# Patient Record
Sex: FEMALE | Race: OTHER | URBAN - METROPOLITAN AREA
[De-identification: names, ages, dates, MRNs, and addresses within clinical notes are randomized per-mention and may not be internally consistent; named-entity substitution may affect disease eponyms.]

---

## 2024-10-21 LAB
ABO, EXTERNAL RESULT: NORMAL
C. TRACHOMATIS, EXTERNAL RESULT: NOT DETECTED
HEP B, EXTERNAL RESULT: NEGATIVE
HEPATITIS C ANTIBODY, EXTERNAL RESULT: NEGATIVE
HIV, EXTERNAL RESULT: NORMAL
N. GONORRHOEAE, EXTERNAL RESULT: NOT DETECTED
RPR, EXTERNAL RESULT: NON REACTIVE
RUBELLA TITER, EXTERNAL RESULT: NORMAL

## 2024-10-29 ENCOUNTER — HOSPITAL ENCOUNTER (EMERGENCY)
Age: 29
Discharge: HOME | End: 2024-10-30
Payer: MEDICAID

## 2024-10-29 VITALS
OXYGEN SATURATION: 100 % | BODY MASS INDEX: 24.84 KG/M2 | HEART RATE: 92 BPM | TEMPERATURE: 98.3 F | DIASTOLIC BLOOD PRESSURE: 64 MMHG | WEIGHT: 135 LBS | RESPIRATION RATE: 18 BRPM | SYSTOLIC BLOOD PRESSURE: 118 MMHG | HEIGHT: 62 IN

## 2024-10-29 DIAGNOSIS — O21.9 NAUSEA AND VOMITING IN PREGNANCY: Primary | ICD-10-CM

## 2024-10-29 LAB
ALBUMIN SERPL-MCNC: 4.1 G/DL (ref 3.4–5)
ALBUMIN/GLOB SERPL: 1.3 {RATIO} (ref 1.1–2.2)
ALP SERPL-CCNC: 63 U/L (ref 40–129)
ALT SERPL-CCNC: 11 U/L (ref 10–40)
ANION GAP SERPL CALCULATED.3IONS-SCNC: 12 MMOL/L (ref 9–17)
AST SERPL-CCNC: 17 U/L (ref 15–37)
BASOPHILS # BLD: 0.03 K/UL
BASOPHILS NFR BLD: 0 % (ref 0–1)
BILIRUB SERPL-MCNC: 0.3 MG/DL (ref 0–1)
BUN SERPL-MCNC: 7 MG/DL (ref 7–20)
CALCIUM SERPL-MCNC: 9.4 MG/DL (ref 8.3–10.6)
CHLORIDE SERPL-SCNC: 104 MMOL/L (ref 99–110)
CO2 SERPL-SCNC: 21 MMOL/L (ref 21–32)
CREAT SERPL-MCNC: 0.4 MG/DL (ref 0.6–1.1)
EOSINOPHIL # BLD: 0.09 K/UL
EOSINOPHILS RELATIVE PERCENT: 1 % (ref 0–3)
ERYTHROCYTE [DISTWIDTH] IN BLOOD BY AUTOMATED COUNT: 13.2 % (ref 11.7–14.9)
GFR, ESTIMATED: >90 ML/MIN/1.73M2
GLUCOSE SERPL-MCNC: 102 MG/DL (ref 74–99)
HCT VFR BLD AUTO: 36 % (ref 37–47)
HGB BLD-MCNC: 11.5 G/DL (ref 12.5–16)
IMM GRANULOCYTES # BLD AUTO: 0.02 K/UL
IMM GRANULOCYTES NFR BLD: 0 %
LYMPHOCYTES NFR BLD: 3.13 K/UL
LYMPHOCYTES RELATIVE PERCENT: 38 % (ref 24–44)
MCH RBC QN AUTO: 26.2 PG (ref 27–31)
MCHC RBC AUTO-ENTMCNC: 31.9 G/DL (ref 32–36)
MCV RBC AUTO: 82 FL (ref 78–100)
MONOCYTES NFR BLD: 0.71 K/UL
MONOCYTES NFR BLD: 9 % (ref 0–4)
NEUTROPHILS NFR BLD: 51 % (ref 36–66)
NEUTS SEG NFR BLD: 4.17 K/UL
PLATELET # BLD AUTO: 353 K/UL (ref 140–440)
PMV BLD AUTO: 9.9 FL (ref 7.5–11.1)
POTASSIUM SERPL-SCNC: 3.5 MMOL/L (ref 3.5–5.1)
PROT SERPL-MCNC: 7.3 G/DL (ref 6.4–8.2)
RBC # BLD AUTO: 4.39 M/UL (ref 4.2–5.4)
SODIUM SERPL-SCNC: 138 MMOL/L (ref 136–145)
WBC OTHER # BLD: 8.2 K/UL (ref 4–10.5)

## 2024-10-29 PROCEDURE — 6360000002 HC RX W HCPCS: Performed by: PHYSICIAN ASSISTANT

## 2024-10-29 PROCEDURE — 81001 URINALYSIS AUTO W/SCOPE: CPT

## 2024-10-29 PROCEDURE — 85025 COMPLETE CBC W/AUTO DIFF WBC: CPT

## 2024-10-29 PROCEDURE — 99284 EMERGENCY DEPT VISIT MOD MDM: CPT

## 2024-10-29 PROCEDURE — 80053 COMPREHEN METABOLIC PANEL: CPT

## 2024-10-29 PROCEDURE — 96374 THER/PROPH/DIAG INJ IV PUSH: CPT

## 2024-10-29 PROCEDURE — 6370000000 HC RX 637 (ALT 250 FOR IP): Performed by: EMERGENCY MEDICINE

## 2024-10-29 RX ORDER — ONDANSETRON 2 MG/ML
4 INJECTION INTRAMUSCULAR; INTRAVENOUS ONCE
Status: COMPLETED | OUTPATIENT
Start: 2024-10-29 | End: 2024-10-29

## 2024-10-29 RX ORDER — ONDANSETRON 4 MG/1
8 TABLET, ORALLY DISINTEGRATING ORAL ONCE
Status: COMPLETED | OUTPATIENT
Start: 2024-10-29 | End: 2024-10-29

## 2024-10-29 RX ADMIN — ONDANSETRON 4 MG: 2 INJECTION INTRAMUSCULAR; INTRAVENOUS at 22:58

## 2024-10-29 RX ADMIN — ONDANSETRON 8 MG: 4 TABLET, ORALLY DISINTEGRATING ORAL at 22:38

## 2024-10-29 ASSESSMENT — LIFESTYLE VARIABLES
HOW OFTEN DO YOU HAVE A DRINK CONTAINING ALCOHOL: NEVER
HOW MANY STANDARD DRINKS CONTAINING ALCOHOL DO YOU HAVE ON A TYPICAL DAY: PATIENT DOES NOT DRINK

## 2024-10-29 ASSESSMENT — PAIN SCALES - GENERAL: PAINLEVEL_OUTOF10: 0

## 2024-10-30 LAB
BILIRUB UR QL STRIP: NEGATIVE
CHARACTER UR: ABNORMAL
CLARITY UR: CLEAR
COLOR UR: YELLOW
EPI CELLS #/AREA URNS HPF: 3 /HPF
GLUCOSE UR STRIP-MCNC: NEGATIVE MG/DL
HGB UR QL STRIP.AUTO: NEGATIVE
KETONES UR STRIP-MCNC: 40 MG/DL
LEUKOCYTE ESTERASE UR QL STRIP: ABNORMAL
MUCOUS THREADS URNS QL MICRO: ABNORMAL
NITRITE UR QL STRIP: NEGATIVE
PH UR STRIP: 8.5 [PH] (ref 5–8)
PROT UR STRIP-MCNC: ABNORMAL MG/DL
RBC #/AREA URNS HPF: 1 /HPF (ref 0–2)
SP GR UR STRIP: 1.02 (ref 1–1.03)
TRICHOMONAS #/AREA URNS HPF: ABNORMAL /[HPF]
UROBILINOGEN UR STRIP-ACNC: 0.2 EU/DL (ref 0–1)
WBC #/AREA URNS HPF: 2 /HPF (ref 0–5)

## 2024-10-30 RX ORDER — ONDANSETRON 4 MG/1
4 TABLET, ORALLY DISINTEGRATING ORAL 3 TIMES DAILY PRN
Qty: 21 TABLET | Refills: 0 | Status: SHIPPED | OUTPATIENT
Start: 2024-10-30

## 2024-10-30 NOTE — ED PROVIDER NOTES
EMERGENCY DEPARTMENT ENCOUNTER        Pt Name: Hannah Vaz  MRN: [id number]  Birthdate 1995  Date of evaluation: 10/29/2024  Provider: Rosa Ocasio PA-C  PCP: No primary care provider on file.    RANI. I have evaluated this patient.        Triage CHIEF COMPLAINT       Chief Complaint   Patient presents with    Emesis During Pregnancy     12 weeks pregnant and has been vomiting all day.          HISTORY OF PRESENT ILLNESS      Chief Complaint: Vomiting in pregnancy    Hannah Vaz is a 29 y.o. female who presents for vomiting in pregnancy.  History obtained using [facility]  #[id number].  She states she is about 12 weeks pregnant.  She has had vomiting throughout pregnancy but it has been all day today.  She denies abd pain, diarrhea, urinary symptoms, vaginal bleeding.          Nursing Notes were all reviewed and agreed with or any disagreements were addressed in the HPI.    REVIEW OF SYSTEMS     CONSTITUTIONAL:  Denies fever.  EYES:  Denies visual changes.  HEAD:  Denies headache.  ENT:  Denies earache, nasal congestion, sore throat.  NECK:  Denies neck pain.  RESPIRATORY:  Denies any shortness of breath.  CARDIOVASCULAR:  Denies chest pain.  GI:  + vomiting.  :  Denies urinary symptoms.  MUSCULOSKELETAL:  Denies extremity pain or swelling.  BACK:  Denies back pain.  INTEGUMENT:  Denies skin changes.  LYMPHATIC:  Denies lymphadenopathy.  NEUROLOGIC:  Denies any numbness/tingling.  PSYCHIATRIC:  Denies SI/HI.    PAST MEDICAL HISTORY   No past medical history on file.    SURGICAL HISTORY   No past surgical history on file.    CURRENTMEDICATIONS       Previous Medications    No medications on file       ALLERGIES     Patient has no known allergies.    FAMILYHISTORY     No family history on file.     SOCIAL HISTORY       Social History     Socioeconomic History    Marital status: Unknown       SCREENINGS           PHYSICAL EXAM       ED Triage Vitals   BP Systolic BP Percentile

## 2025-03-05 DIAGNOSIS — O24.419 GDM, CLASS A2: Primary | ICD-10-CM

## 2025-04-04 LAB — GBS, EXTERNAL RESULT: POSITIVE

## 2025-04-27 ENCOUNTER — ANESTHESIA EVENT (OUTPATIENT)
Dept: LABOR AND DELIVERY | Age: 30
End: 2025-04-27
Payer: MEDICAID

## 2025-04-27 ENCOUNTER — HOSPITAL ENCOUNTER (INPATIENT)
Age: 30
LOS: 5 days | Discharge: HOME | End: 2025-05-02
Attending: OBSTETRICS & GYNECOLOGY | Admitting: OBSTETRICS & GYNECOLOGY
Payer: MEDICAID

## 2025-04-27 ENCOUNTER — ANESTHESIA (OUTPATIENT)
Dept: LABOR AND DELIVERY | Age: 30
End: 2025-04-27
Payer: MEDICAID

## 2025-04-27 DIAGNOSIS — Z98.891 S/P PRIMARY LOW TRANSVERSE C-SECTION: Primary | ICD-10-CM

## 2025-04-27 LAB
ABO + RH BLD: NORMAL
ALBUMIN SERPL-MCNC: 3.8 G/DL (ref 3.4–5)
ALBUMIN/GLOB SERPL: 1.3 {RATIO} (ref 1.1–2.2)
ALP SERPL-CCNC: 177 U/L (ref 40–129)
ALT SERPL-CCNC: 21 U/L (ref 10–40)
ANION GAP SERPL CALCULATED.3IONS-SCNC: 18 MMOL/L (ref 9–17)
AST SERPL-CCNC: 24 U/L (ref 15–37)
BASOPHILS # BLD: 0.02 K/UL
BASOPHILS NFR BLD: 0 % (ref 0–1)
BILIRUB SERPL-MCNC: 0.3 MG/DL (ref 0–1)
BLOOD BANK SAMPLE EXPIRATION: NORMAL
BLOOD GROUP ANTIBODIES SERPL: NEGATIVE
BUN SERPL-MCNC: 11 MG/DL (ref 7–20)
CALCIUM SERPL-MCNC: 10.3 MG/DL (ref 8.3–10.6)
CHLORIDE SERPL-SCNC: 103 MMOL/L (ref 99–110)
CO2 SERPL-SCNC: 16 MMOL/L (ref 21–32)
CREAT SERPL-MCNC: 0.6 MG/DL (ref 0.6–1.1)
CREAT UR-MCNC: 190 MG/DL (ref 28–217)
EOSINOPHIL # BLD: 0.01 K/UL
EOSINOPHILS RELATIVE PERCENT: 0 % (ref 0–3)
ERYTHROCYTE [DISTWIDTH] IN BLOOD BY AUTOMATED COUNT: 15.5 % (ref 11.7–14.9)
GFR, ESTIMATED: >90 ML/MIN/1.73M2
GLUCOSE BLD-MCNC: 116 MG/DL (ref 74–99)
GLUCOSE BLD-MCNC: 77 MG/DL (ref 74–99)
GLUCOSE SERPL-MCNC: 104 MG/DL (ref 74–99)
HCT VFR BLD AUTO: 38.6 % (ref 37–47)
HGB BLD-MCNC: 12.2 G/DL (ref 12.5–16)
IMM GRANULOCYTES # BLD AUTO: 0.03 K/UL
IMM GRANULOCYTES NFR BLD: 0 %
LYMPHOCYTES NFR BLD: 2.03 K/UL
LYMPHOCYTES RELATIVE PERCENT: 22 % (ref 24–44)
MCH RBC QN AUTO: 25.6 PG (ref 27–31)
MCHC RBC AUTO-ENTMCNC: 31.6 G/DL (ref 32–36)
MCV RBC AUTO: 81.1 FL (ref 78–100)
MONOCYTES NFR BLD: 0.65 K/UL
MONOCYTES NFR BLD: 7 % (ref 0–5)
NEUTROPHILS NFR BLD: 71 % (ref 36–66)
NEUTS SEG NFR BLD: 6.62 K/UL
PLATELET # BLD AUTO: 320 K/UL (ref 140–440)
PMV BLD AUTO: 10.9 FL (ref 7.5–11.1)
POTASSIUM SERPL-SCNC: 4.2 MMOL/L (ref 3.5–5.1)
PROT SERPL-MCNC: 6.7 G/DL (ref 6.4–8.2)
RBC # BLD AUTO: 4.76 M/UL (ref 4.2–5.4)
SODIUM SERPL-SCNC: 138 MMOL/L (ref 136–145)
T PALLIDUM AB SER QL IA: NONREACTIVE
TOTAL PROTEIN, URINE: 48 MG/DL
URATE SERPL-MCNC: 6.7 MG/DL (ref 2.6–6)
URINE TOTAL PROTEIN CREATININE RATIO: 0.25 (ref 0–0.2)
WBC OTHER # BLD: 9.4 K/UL (ref 4–10.5)

## 2025-04-27 PROCEDURE — 82570 ASSAY OF URINE CREATININE: CPT

## 2025-04-27 PROCEDURE — 84550 ASSAY OF BLOOD/URIC ACID: CPT

## 2025-04-27 PROCEDURE — 82962 GLUCOSE BLOOD TEST: CPT

## 2025-04-27 PROCEDURE — 86780 TREPONEMA PALLIDUM: CPT

## 2025-04-27 PROCEDURE — 86850 RBC ANTIBODY SCREEN: CPT

## 2025-04-27 PROCEDURE — 84156 ASSAY OF PROTEIN URINE: CPT

## 2025-04-27 PROCEDURE — 6370000000 HC RX 637 (ALT 250 FOR IP): Performed by: OBSTETRICS & GYNECOLOGY

## 2025-04-27 PROCEDURE — 1220000000 HC SEMI PRIVATE OB R&B

## 2025-04-27 PROCEDURE — 6360000002 HC RX W HCPCS: Performed by: OBSTETRICS & GYNECOLOGY

## 2025-04-27 PROCEDURE — 86900 BLOOD TYPING SEROLOGIC ABO: CPT

## 2025-04-27 PROCEDURE — 80307 DRUG TEST PRSMV CHEM ANLYZR: CPT

## 2025-04-27 PROCEDURE — 80053 COMPREHEN METABOLIC PANEL: CPT

## 2025-04-27 PROCEDURE — 2580000003 HC RX 258: Performed by: OBSTETRICS & GYNECOLOGY

## 2025-04-27 PROCEDURE — 86901 BLOOD TYPING SEROLOGIC RH(D): CPT

## 2025-04-27 PROCEDURE — 85025 COMPLETE CBC W/AUTO DIFF WBC: CPT

## 2025-04-27 RX ORDER — CARBOPROST TROMETHAMINE 250 UG/ML
250 INJECTION, SOLUTION INTRAMUSCULAR PRN
Status: DISCONTINUED | OUTPATIENT
Start: 2025-04-27 | End: 2025-05-02 | Stop reason: HOSPADM

## 2025-04-27 RX ORDER — SODIUM CHLORIDE 0.9 % (FLUSH) 0.9 %
5-40 SYRINGE (ML) INJECTION EVERY 12 HOURS SCHEDULED
Status: DISCONTINUED | OUTPATIENT
Start: 2025-04-27 | End: 2025-05-02 | Stop reason: HOSPADM

## 2025-04-27 RX ORDER — SODIUM CHLORIDE, SODIUM LACTATE, POTASSIUM CHLORIDE, AND CALCIUM CHLORIDE .6; .31; .03; .02 G/100ML; G/100ML; G/100ML; G/100ML
1000 INJECTION, SOLUTION INTRAVENOUS PRN
Status: DISCONTINUED | OUTPATIENT
Start: 2025-04-27 | End: 2025-05-02 | Stop reason: HOSPADM

## 2025-04-27 RX ORDER — SODIUM CHLORIDE 9 MG/ML
INJECTION, SOLUTION INTRAVENOUS PRN
Status: DISCONTINUED | OUTPATIENT
Start: 2025-04-27 | End: 2025-05-02 | Stop reason: HOSPADM

## 2025-04-27 RX ORDER — GLUCAGON 1 MG/ML
1 KIT INJECTION PRN
Status: DISCONTINUED | OUTPATIENT
Start: 2025-04-27 | End: 2025-05-02 | Stop reason: HOSPADM

## 2025-04-27 RX ORDER — FAMOTIDINE 10 MG/ML
20 INJECTION, SOLUTION INTRAVENOUS 2 TIMES DAILY PRN
Status: DISCONTINUED | OUTPATIENT
Start: 2025-04-27 | End: 2025-04-29 | Stop reason: HOSPADM

## 2025-04-27 RX ORDER — SODIUM CHLORIDE, SODIUM LACTATE, POTASSIUM CHLORIDE, AND CALCIUM CHLORIDE .6; .31; .03; .02 G/100ML; G/100ML; G/100ML; G/100ML
500 INJECTION, SOLUTION INTRAVENOUS PRN
Status: DISCONTINUED | OUTPATIENT
Start: 2025-04-27 | End: 2025-05-02 | Stop reason: HOSPADM

## 2025-04-27 RX ORDER — TERBUTALINE SULFATE 1 MG/ML
0.25 INJECTION SUBCUTANEOUS
Status: DISCONTINUED | OUTPATIENT
Start: 2025-04-27 | End: 2025-05-02 | Stop reason: HOSPADM

## 2025-04-27 RX ORDER — DEXTROSE MONOHYDRATE AND SODIUM CHLORIDE 5; .45 G/100ML; G/100ML
INJECTION, SOLUTION INTRAVENOUS CONTINUOUS
Status: DISCONTINUED | OUTPATIENT
Start: 2025-04-27 | End: 2025-05-02 | Stop reason: HOSPADM

## 2025-04-27 RX ORDER — INSULIN LISPRO 100 [IU]/ML
0-4 INJECTION, SOLUTION INTRAVENOUS; SUBCUTANEOUS PRN
Status: DISCONTINUED | OUTPATIENT
Start: 2025-04-27 | End: 2025-04-30

## 2025-04-27 RX ORDER — DEXTROSE MONOHYDRATE 50 MG/ML
100 INJECTION, SOLUTION INTRAVENOUS PRN
Status: DISCONTINUED | OUTPATIENT
Start: 2025-04-27 | End: 2025-05-02 | Stop reason: HOSPADM

## 2025-04-27 RX ORDER — LIDOCAINE HYDROCHLORIDE 10 MG/ML
30 INJECTION, SOLUTION EPIDURAL; INFILTRATION; INTRACAUDAL; PERINEURAL PRN
Status: DISCONTINUED | OUTPATIENT
Start: 2025-04-27 | End: 2025-05-02 | Stop reason: HOSPADM

## 2025-04-27 RX ORDER — FENTANYL CITRATE 50 UG/ML
100 INJECTION, SOLUTION INTRAMUSCULAR; INTRAVENOUS
Status: DISCONTINUED | OUTPATIENT
Start: 2025-04-27 | End: 2025-04-29 | Stop reason: HOSPADM

## 2025-04-27 RX ORDER — ONDANSETRON 4 MG/1
4 TABLET, ORALLY DISINTEGRATING ORAL EVERY 8 HOURS PRN
Status: DISCONTINUED | OUTPATIENT
Start: 2025-04-27 | End: 2025-04-29 | Stop reason: SDUPTHER

## 2025-04-27 RX ORDER — SODIUM CHLORIDE 0.9 % (FLUSH) 0.9 %
5-40 SYRINGE (ML) INJECTION PRN
Status: DISCONTINUED | OUTPATIENT
Start: 2025-04-27 | End: 2025-05-02 | Stop reason: HOSPADM

## 2025-04-27 RX ORDER — SODIUM CHLORIDE, SODIUM LACTATE, POTASSIUM CHLORIDE, CALCIUM CHLORIDE 600; 310; 30; 20 MG/100ML; MG/100ML; MG/100ML; MG/100ML
INJECTION, SOLUTION INTRAVENOUS CONTINUOUS
Status: DISCONTINUED | OUTPATIENT
Start: 2025-04-27 | End: 2025-05-02 | Stop reason: HOSPADM

## 2025-04-27 RX ORDER — TRANEXAMIC ACID 10 MG/ML
1000 INJECTION, SOLUTION INTRAVENOUS
Status: DISCONTINUED | OUTPATIENT
Start: 2025-04-27 | End: 2025-04-29 | Stop reason: SDUPTHER

## 2025-04-27 RX ORDER — MISOPROSTOL 200 UG/1
400 TABLET ORAL PRN
Status: DISCONTINUED | OUTPATIENT
Start: 2025-04-27 | End: 2025-04-29 | Stop reason: SDUPTHER

## 2025-04-27 RX ORDER — ONDANSETRON 2 MG/ML
4 INJECTION INTRAMUSCULAR; INTRAVENOUS EVERY 6 HOURS PRN
Status: DISCONTINUED | OUTPATIENT
Start: 2025-04-27 | End: 2025-04-29 | Stop reason: SDUPTHER

## 2025-04-27 RX ADMIN — AMPICILLIN SODIUM 2000 MG: 2 INJECTION, POWDER, FOR SOLUTION INTRAVENOUS at 20:28

## 2025-04-27 RX ADMIN — Medication 50 MCG: at 20:31

## 2025-04-27 RX ADMIN — SODIUM CHLORIDE, SODIUM LACTATE, POTASSIUM CHLORIDE, AND CALCIUM CHLORIDE: .6; .31; .03; .02 INJECTION, SOLUTION INTRAVENOUS at 20:27

## 2025-04-27 NOTE — FLOWSHEET NOTE
Patient presents to unit for a scheduled NST. Pt shown to LT02 and oriented to room. Pt denies vaginal bleeding and LOF and states she is feeling FM. EFM and toco applied. Abd palpates soft and non-tender. JUDIE OTEROM made aware of pt's arrival. Call light within reach.

## 2025-04-28 LAB
AMPHET UR QL SCN: NEGATIVE
BARBITURATES UR QL SCN: NEGATIVE
BENZODIAZ UR QL: NEGATIVE
CANNABINOIDS UR QL SCN: NEGATIVE
COCAINE UR QL SCN: NEGATIVE
FENTANYL UR QL: NEGATIVE
GLUCOSE BLD-MCNC: 102 MG/DL (ref 74–99)
GLUCOSE BLD-MCNC: 104 MG/DL (ref 74–99)
GLUCOSE BLD-MCNC: 128 MG/DL (ref 74–99)
GLUCOSE BLD-MCNC: 137 MG/DL (ref 74–99)
GLUCOSE BLD-MCNC: 145 MG/DL (ref 74–99)
GLUCOSE BLD-MCNC: 162 MG/DL (ref 74–99)
GLUCOSE BLD-MCNC: 185 MG/DL (ref 74–99)
GLUCOSE BLD-MCNC: 198 MG/DL (ref 74–99)
GLUCOSE BLD-MCNC: 61 MG/DL (ref 74–99)
GLUCOSE BLD-MCNC: 86 MG/DL (ref 74–99)
GLUCOSE BLD-MCNC: 88 MG/DL (ref 74–99)
GLUCOSE BLD-MCNC: 91 MG/DL (ref 74–99)
GLUCOSE BLD-MCNC: 92 MG/DL (ref 74–99)
OPIATES UR QL SCN: NEGATIVE
OXYCODONE UR QL SCN: NEGATIVE
TEST INFORMATION: NORMAL

## 2025-04-28 PROCEDURE — 6360000002 HC RX W HCPCS: Performed by: OBSTETRICS & GYNECOLOGY

## 2025-04-28 PROCEDURE — 1220000000 HC SEMI PRIVATE OB R&B

## 2025-04-28 PROCEDURE — 82962 GLUCOSE BLOOD TEST: CPT

## 2025-04-28 PROCEDURE — 2580000003 HC RX 258: Performed by: OBSTETRICS & GYNECOLOGY

## 2025-04-28 PROCEDURE — APPNB30 APP NON BILLABLE TIME 0-30 MINS: Performed by: ADVANCED PRACTICE MIDWIFE

## 2025-04-28 PROCEDURE — 6360000002 HC RX W HCPCS: Performed by: ADVANCED PRACTICE MIDWIFE

## 2025-04-28 PROCEDURE — 6370000000 HC RX 637 (ALT 250 FOR IP): Performed by: OBSTETRICS & GYNECOLOGY

## 2025-04-28 RX ORDER — SODIUM CHLORIDE 9 MG/ML
INJECTION, SOLUTION INTRAVENOUS
Status: DISPENSED
Start: 2025-04-28 | End: 2025-04-28

## 2025-04-28 RX ADMIN — DEXTROSE AND SODIUM CHLORIDE: 5; .45 INJECTION, SOLUTION INTRAVENOUS at 08:09

## 2025-04-28 RX ADMIN — SODIUM CHLORIDE, SODIUM LACTATE, POTASSIUM CHLORIDE, AND CALCIUM CHLORIDE: .6; .31; .03; .02 INJECTION, SOLUTION INTRAVENOUS at 17:51

## 2025-04-28 RX ADMIN — AMPICILLIN SODIUM 1000 MG: 1 INJECTION, POWDER, FOR SOLUTION INTRAMUSCULAR; INTRAVENOUS at 04:37

## 2025-04-28 RX ADMIN — AMPICILLIN SODIUM 1000 MG: 1 INJECTION, POWDER, FOR SOLUTION INTRAMUSCULAR; INTRAVENOUS at 08:00

## 2025-04-28 RX ADMIN — AMPICILLIN SODIUM 1000 MG: 1 INJECTION, POWDER, FOR SOLUTION INTRAMUSCULAR; INTRAVENOUS at 00:36

## 2025-04-28 RX ADMIN — INSULIN LISPRO 3 UNITS: 100 INJECTION, SOLUTION INTRAVENOUS; SUBCUTANEOUS at 10:42

## 2025-04-28 RX ADMIN — AMPICILLIN SODIUM 1000 MG: 1 INJECTION, POWDER, FOR SOLUTION INTRAMUSCULAR; INTRAVENOUS at 20:21

## 2025-04-28 RX ADMIN — DEXTROSE AND SODIUM CHLORIDE: 5; .45 INJECTION, SOLUTION INTRAVENOUS at 09:05

## 2025-04-28 RX ADMIN — ONDANSETRON 4 MG: 2 INJECTION INTRAMUSCULAR; INTRAVENOUS at 17:24

## 2025-04-28 RX ADMIN — DEXTROSE AND SODIUM CHLORIDE: 5; .45 INJECTION, SOLUTION INTRAVENOUS at 03:23

## 2025-04-28 RX ADMIN — Medication 1 MILLI-UNITS/MIN: at 10:35

## 2025-04-28 RX ADMIN — INSULIN LISPRO 4 UNITS: 100 INJECTION, SOLUTION INTRAVENOUS; SUBCUTANEOUS at 03:22

## 2025-04-28 RX ADMIN — INSULIN LISPRO 4 UNITS: 100 INJECTION, SOLUTION INTRAVENOUS; SUBCUTANEOUS at 04:44

## 2025-04-28 RX ADMIN — INSULIN LISPRO 3 UNITS: 100 INJECTION, SOLUTION INTRAVENOUS; SUBCUTANEOUS at 12:17

## 2025-04-28 RX ADMIN — SODIUM CHLORIDE, SODIUM LACTATE, POTASSIUM CHLORIDE, AND CALCIUM CHLORIDE: .6; .31; .03; .02 INJECTION, SOLUTION INTRAVENOUS at 07:37

## 2025-04-28 RX ADMIN — AMPICILLIN SODIUM 1000 MG: 1 INJECTION, POWDER, FOR SOLUTION INTRAMUSCULAR; INTRAVENOUS at 16:02

## 2025-04-28 RX ADMIN — AMPICILLIN SODIUM 1000 MG: 1 INJECTION, POWDER, FOR SOLUTION INTRAMUSCULAR; INTRAVENOUS at 11:57

## 2025-04-28 RX ADMIN — INSULIN LISPRO 1 UNITS: 100 INJECTION, SOLUTION INTRAVENOUS; SUBCUTANEOUS at 22:51

## 2025-04-28 RX ADMIN — DEXTROSE AND SODIUM CHLORIDE: 5; .45 INJECTION, SOLUTION INTRAVENOUS at 17:25

## 2025-04-28 RX ADMIN — Medication 50 MCG: at 00:36

## 2025-04-28 NOTE — PROGRESS NOTES
Department of Obstetrics and Gynecology  Labor and Delivery   Midwifery Progress Note      SUBJECTIVE:  Hannah is comfortable with ctxs.     OBJECTIVE:      Fetal heart rate:       Baseline Heart Rate:  145        Accelerations:  present       Variability:  moderate       Decelerations:  absent         Contraction frequency: 2 minutes    Membranes:  Ruptured clear fluid    Cervix:         Dilation:  4 cm         Effacement:  60         Station:  -2         Position:  mid             ASSESSMENT     28 yo  at 38 weeks   Cat I FHR tracing  GBS +   Type II DM; uncontrolled     PLAN:    AROM'd for moderate amount of clear fluid.     Continue to titrate pitocin per protocol.     Anticipate labor progress and .     I have collaborated and updated Dr Hogan and the MD agrees with the current POC.     Time Spent: 10 min    SUE Amado CNM

## 2025-04-28 NOTE — H&P
Department of Obstetrics and Gynecology   Obstetrics History and Physical        CHIEF COMPLAINT: Patient presents for scheduled fetal nonstress test due to diabetes    HISTORY OF PRESENT ILLNESS:      The patient is a 29 y.o. female at 37w6d.  OB History          1    Para        Term                AB        Living             SAB        IAB        Ectopic        Molar        Multiple        Live Births                Patient presents with a chief complaint as above and is being admitted for induction due to uncontrolled diabetes, gestational hypertension    Estimated Due Date: Estimated Date of Delivery: 25    PRENATAL CARE:    Complicated by: Pre-existing diabetes managed by MFM    PAST OB HISTORY  OB History          1    Para        Term                AB        Living             SAB        IAB        Ectopic        Molar        Multiple        Live Births                    Past Medical History:        Diagnosis Date    Diabetes type 2 (HCC)      Past Surgical History:    History reviewed. No pertinent surgical history.  Allergies:  Patient has no known allergies.  Social History:    Social History     Socioeconomic History    Marital status: Unknown     Spouse name: Not on file    Number of children: Not on file    Years of education: Not on file    Highest education level: Not on file   Occupational History    Not on file   Tobacco Use    Smoking status: Not on file    Smokeless tobacco: Not on file   Substance and Sexual Activity    Alcohol use: Not on file    Drug use: Not on file    Sexual activity: Not on file   Other Topics Concern    Not on file   Social History Narrative    Not on file     Social Drivers of Health     Financial Resource Strain: Not on file   Food Insecurity: Food Insecurity Present (2025)    Hunger Vital Sign     Worried About Running Out of Food in the Last Year: Sometimes true     Ran Out of Food in the Last Year: Sometimes true

## 2025-04-28 NOTE — PLAN OF CARE
Problem: Chronic Conditions and Co-morbidities  Goal: Patient's chronic conditions and co-morbidity symptoms are monitored and maintained or improved  Outcome: Progressing  Flowsheets (Taken 2025)  Care Plan - Patient's Chronic Conditions and Co-Morbidity Symptoms are Monitored and Maintained or Improved:   Monitor and assess patient's chronic conditions and comorbid symptoms for stability, deterioration, or improvement   Collaborate with multidisciplinary team to address chronic and comorbid conditions and prevent exacerbation or deterioration   Update acute care plan with appropriate goals if chronic or comorbid symptoms are exacerbated and prevent overall improvement and discharge     Problem: Vaginal Birth or  Section  Goal: Fetal and maternal status remain reassuring during the birth process  Description:  Birth OB-Pregnancy care plan goal which identifies if the fetal and maternal status remain reassuring during the birth process  Outcome: Progressing  Flowsheets (Taken 2025)  Fetal and Maternal Status Remain Reassuring During the Birth Process:   Monitor vital signs   Monitor uterine activity   Deep vein thrombosis prophylaxis ( delivery)   Monitor fetal heart rate   Monitor labor progression (Vaginal delivery)     Problem: Pain  Goal: Verbalizes/displays adequate comfort level or baseline comfort level  Outcome: Progressing  Flowsheets (Taken 2025)  Verbalizes/displays adequate comfort level or baseline comfort level:   Encourage patient to monitor pain and request assistance   Assess pain using appropriate pain scale   Administer analgesics based on type and severity of pain and evaluate response   Implement non-pharmacological measures as appropriate and evaluate response   Consider cultural and social influences on pain and pain management   Notify Licensed Independent Practitioner if interventions unsuccessful or patient reports new pain     Problem:

## 2025-04-28 NOTE — PLAN OF CARE
Problem: Chronic Conditions and Co-morbidities  Goal: Patient's chronic conditions and co-morbidity symptoms are monitored and maintained or improved  4/28/2025 0804 by Kaykay Acosta, RN  Outcome: Progressing  4/27/2025 2129 by Klarissa Murrell, RN  Outcome: Progressing  Flowsheets (Taken 4/27/2025 2129)  Care Plan - Patient's Chronic Conditions and Co-Morbidity Symptoms are Monitored and Maintained or Improved:   Monitor and assess patient's chronic conditions and comorbid symptoms for stability, deterioration, or improvement   Collaborate with multidisciplinary team to address chronic and comorbid conditions and prevent exacerbation or deterioration   Update acute care plan with appropriate goals if chronic or comorbid symptoms are exacerbated and prevent overall improvement and discharge

## 2025-04-28 NOTE — FLOWSHEET NOTE
Patient ambulated to labor room 6 for induction.  Oriented to room.  Instructed patient to change into gown.  Verbalizes understanding.  Call light within reach.

## 2025-04-28 NOTE — CONSULTS
Session ID: [id number]  Language: Spencer Heath   ID: #[id number]   Name: Chino    Rajeev for assessment, patient educated on epidural, wishes to continue to labor without at this time. Patient educated on increase in pain with titration of pitocin, verbalizes understanding. Advised that patient should move as much as she can to help progress labor, patient verbalizes understanding. Patient and support people deny any questions or needs at this time.

## 2025-04-28 NOTE — PROGRESS NOTES
Patient/caregivers speak Bangladeshi Creole as their preferred language for their healthcare communication. For safe communication, use the [employer]  carts or call:      Spencer Angulo Senior -Navigator [phone]    General phone: [phone] ( [phone]) Email: [email or web address]      Please always document the use of interpreting services ('s ID number) in your clinical notes.      Our interpreters are available for team members working with limited English proficient (LEP) patients remotely, via phone or video or in person (if needed for special cases).      When using family members to interpret, for the safety of the patient and protection of the communication of both our patient and [facility] staff the VRI or telephonic  should remain on the line to monitor that all communication is accurate and complete. The  should be instructed to notify [facility] staff immediately if there are any inaccuracies.    Thank you,      Jaiden Haider  Senior /Navigator  Language Services Department  [phone]

## 2025-04-28 NOTE — FLOWSHEET NOTE
Patient laying supine.  Instructed patient to turn to side and remain there.  Verbalizes understanding.  Patient repositioned to left side.

## 2025-04-28 NOTE — ANESTHESIA PRE PROCEDURE
Department of Anesthesiology  Preprocedure Note       Name:  Hannah Vaz   Age:  29 y.o.  :  1995                                          MRN:  [id number]         Date:  2025      Surgeon: * No surgeons listed *    Procedure: * No procedures listed *    Medications prior to admission:   Prior to Admission medications    Medication Sig Start Date End Date Taking? Authorizing Provider   metFORMIN (GLUCOPHAGE) 1000 MG tablet Take 1 tablet by mouth 2 times daily (with meals)   Yes Provider, MD Ananya   ondansetron (ZOFRAN-ODT) 4 MG disintegrating tablet Take 1 tablet by mouth 3 times daily as needed for Nausea or Vomiting  Patient not taking: Reported on 2025 10/30/24   Rosa Ocasio PA-C       Current medications:    Current Facility-Administered Medications   Medication Dose Route Frequency Provider Last Rate Last Admin   • lactated ringers infusion   IntraVENous Continuous Nick Diamond  mL/hr at 25 New Bag at 25   • lactated ringers bolus 500 mL  500 mL IntraVENous PRN Nick Diamond MD        Or   • lactated ringers bolus 1,000 mL  1,000 mL IntraVENous PRN Nick Diamond MD       • sodium chloride flush 0.9 % injection 5-40 mL  5-40 mL IntraVENous 2 times per day Nick Diamond MD       • sodium chloride flush 0.9 % injection 5-40 mL  5-40 mL IntraVENous PRN Nick Diamond MD       • 0.9 % sodium chloride infusion   IntraVENous PRN Nick Diamond MD       • carboprost (HEMABATE) injection 250 mcg  250 mcg IntraMUSCular PRN Nick Diamond MD       • miSOPROStol (CYTOTEC) tablet 400 mcg  400 mcg Buccal PRN Nick Diamond MD       • tranexamic acid-NaCl IVPB premix 1,000 mg  1,000 mg IntraVENous Once PRN Nick Diamond MD       • oxytocin (PITOCIN) 30 units in 500 mL infusion  87.3 sheila-units/min IntraVENous Continuous PRN Nick Diamond MD        And   •

## 2025-04-29 LAB
GLUCOSE BLD-MCNC: 137 MG/DL (ref 74–99)
GLUCOSE BLD-MCNC: 139 MG/DL (ref 74–99)
GLUCOSE BLD-MCNC: 152 MG/DL (ref 74–99)
GLUCOSE BLD-MCNC: 187 MG/DL (ref 74–99)
GLUCOSE BLD-MCNC: 207 MG/DL (ref 74–99)
GLUCOSE BLD-MCNC: 207 MG/DL (ref 74–99)

## 2025-04-29 PROCEDURE — 3700000001 HC ADD 15 MINUTES (ANESTHESIA): Performed by: OBSTETRICS & GYNECOLOGY

## 2025-04-29 PROCEDURE — 6360000002 HC RX W HCPCS: Performed by: OBSTETRICS & GYNECOLOGY

## 2025-04-29 PROCEDURE — 6370000000 HC RX 637 (ALT 250 FOR IP): Performed by: OBSTETRICS & GYNECOLOGY

## 2025-04-29 PROCEDURE — 7100000000 HC PACU RECOVERY - FIRST 15 MIN: Performed by: OBSTETRICS & GYNECOLOGY

## 2025-04-29 PROCEDURE — 3609079900 HC CESAREAN SECTION: Performed by: OBSTETRICS & GYNECOLOGY

## 2025-04-29 PROCEDURE — 2580000003 HC RX 258: Performed by: OBSTETRICS & GYNECOLOGY

## 2025-04-29 PROCEDURE — 82962 GLUCOSE BLOOD TEST: CPT

## 2025-04-29 PROCEDURE — 2500000003 HC RX 250 WO HCPCS: Performed by: OBSTETRICS & GYNECOLOGY

## 2025-04-29 PROCEDURE — 2709999900 HC NON-CHARGEABLE SUPPLY: Performed by: OBSTETRICS & GYNECOLOGY

## 2025-04-29 PROCEDURE — 3700000025 EPIDURAL BLOCK: Performed by: ANESTHESIOLOGY

## 2025-04-29 PROCEDURE — 7100000001 HC PACU RECOVERY - ADDTL 15 MIN: Performed by: OBSTETRICS & GYNECOLOGY

## 2025-04-29 PROCEDURE — 1220000000 HC SEMI PRIVATE OB R&B

## 2025-04-29 PROCEDURE — 6360000002 HC RX W HCPCS: Performed by: NURSE ANESTHETIST, CERTIFIED REGISTERED

## 2025-04-29 PROCEDURE — 3700000000 HC ANESTHESIA ATTENDED CARE: Performed by: OBSTETRICS & GYNECOLOGY

## 2025-04-29 PROCEDURE — 94761 N-INVAS EAR/PLS OXIMETRY MLT: CPT

## 2025-04-29 PROCEDURE — 2720000010 HC SURG SUPPLY STERILE: Performed by: OBSTETRICS & GYNECOLOGY

## 2025-04-29 RX ORDER — ROPIVACAINE HYDROCHLORIDE 2 MG/ML
10 INJECTION, SOLUTION EPIDURAL; INFILTRATION; PERINEURAL CONTINUOUS
Status: DISCONTINUED | OUTPATIENT
Start: 2025-04-29 | End: 2025-04-29 | Stop reason: HOSPADM

## 2025-04-29 RX ORDER — SODIUM CHLORIDE 0.9 % (FLUSH) 0.9 %
10 SYRINGE (ML) INJECTION EVERY 12 HOURS SCHEDULED
Status: DISCONTINUED | OUTPATIENT
Start: 2025-04-29 | End: 2025-05-02 | Stop reason: HOSPADM

## 2025-04-29 RX ORDER — ACETAMINOPHEN 500 MG
1000 TABLET ORAL EVERY 8 HOURS
Status: DISCONTINUED | OUTPATIENT
Start: 2025-04-29 | End: 2025-05-02 | Stop reason: HOSPADM

## 2025-04-29 RX ORDER — FENTANYL CITRATE 50 UG/ML
INJECTION, SOLUTION INTRAMUSCULAR; INTRAVENOUS
Status: DISCONTINUED | OUTPATIENT
Start: 2025-04-29 | End: 2025-04-29 | Stop reason: SDUPTHER

## 2025-04-29 RX ORDER — ONDANSETRON 4 MG/1
4 TABLET, ORALLY DISINTEGRATING ORAL EVERY 6 HOURS PRN
Status: DISCONTINUED | OUTPATIENT
Start: 2025-04-29 | End: 2025-04-29 | Stop reason: SDUPTHER

## 2025-04-29 RX ORDER — DEXAMETHASONE SODIUM PHOSPHATE 4 MG/ML
INJECTION, SOLUTION INTRA-ARTICULAR; INTRALESIONAL; INTRAMUSCULAR; INTRAVENOUS; SOFT TISSUE
Status: DISCONTINUED | OUTPATIENT
Start: 2025-04-29 | End: 2025-04-29 | Stop reason: SDUPTHER

## 2025-04-29 RX ORDER — SODIUM CHLORIDE 9 MG/ML
INJECTION, SOLUTION INTRAVENOUS PRN
Status: DISCONTINUED | OUTPATIENT
Start: 2025-04-29 | End: 2025-05-02 | Stop reason: HOSPADM

## 2025-04-29 RX ORDER — ONDANSETRON 4 MG/1
4 TABLET, ORALLY DISINTEGRATING ORAL EVERY 8 HOURS PRN
Status: DISCONTINUED | OUTPATIENT
Start: 2025-04-29 | End: 2025-05-02 | Stop reason: HOSPADM

## 2025-04-29 RX ORDER — ENOXAPARIN SODIUM 100 MG/ML
40 INJECTION SUBCUTANEOUS DAILY
Status: DISCONTINUED | OUTPATIENT
Start: 2025-04-29 | End: 2025-05-02 | Stop reason: HOSPADM

## 2025-04-29 RX ORDER — FERROUS SULFATE 325(65) MG
325 TABLET ORAL EVERY OTHER DAY
Status: DISCONTINUED | OUTPATIENT
Start: 2025-04-29 | End: 2025-05-02 | Stop reason: HOSPADM

## 2025-04-29 RX ORDER — NALBUPHINE HYDROCHLORIDE 10 MG/ML
5 INJECTION INTRAMUSCULAR; INTRAVENOUS; SUBCUTANEOUS EVERY 4 HOURS PRN
Status: ACTIVE | OUTPATIENT
Start: 2025-04-29 | End: 2025-04-30

## 2025-04-29 RX ORDER — MISOPROSTOL 200 UG/1
800 TABLET ORAL PRN
Status: DISCONTINUED | OUTPATIENT
Start: 2025-04-29 | End: 2025-05-02 | Stop reason: HOSPADM

## 2025-04-29 RX ORDER — ONDANSETRON 2 MG/ML
INJECTION INTRAMUSCULAR; INTRAVENOUS
Status: DISCONTINUED | OUTPATIENT
Start: 2025-04-29 | End: 2025-04-29 | Stop reason: SDUPTHER

## 2025-04-29 RX ORDER — NALOXONE HYDROCHLORIDE 0.4 MG/ML
INJECTION, SOLUTION INTRAMUSCULAR; INTRAVENOUS; SUBCUTANEOUS PRN
Status: DISCONTINUED | OUTPATIENT
Start: 2025-04-29 | End: 2025-04-29 | Stop reason: HOSPADM

## 2025-04-29 RX ORDER — SODIUM CHLORIDE 0.9 % (FLUSH) 0.9 %
5-40 SYRINGE (ML) INJECTION PRN
Status: DISCONTINUED | OUTPATIENT
Start: 2025-04-29 | End: 2025-05-02 | Stop reason: HOSPADM

## 2025-04-29 RX ORDER — ONDANSETRON 2 MG/ML
4 INJECTION INTRAMUSCULAR; INTRAVENOUS EVERY 6 HOURS PRN
Status: DISCONTINUED | OUTPATIENT
Start: 2025-04-29 | End: 2025-04-29 | Stop reason: SDUPTHER

## 2025-04-29 RX ORDER — MISOPROSTOL 200 UG/1
400 TABLET ORAL PRN
Status: DISCONTINUED | OUTPATIENT
Start: 2025-04-29 | End: 2025-05-02 | Stop reason: HOSPADM

## 2025-04-29 RX ORDER — OXYCODONE HYDROCHLORIDE 5 MG/1
10 TABLET ORAL EVERY 4 HOURS PRN
Status: DISCONTINUED | OUTPATIENT
Start: 2025-04-29 | End: 2025-05-02 | Stop reason: HOSPADM

## 2025-04-29 RX ORDER — NALOXONE HYDROCHLORIDE 0.4 MG/ML
INJECTION, SOLUTION INTRAMUSCULAR; INTRAVENOUS; SUBCUTANEOUS PRN
Status: ACTIVE | OUTPATIENT
Start: 2025-04-29 | End: 2025-04-30

## 2025-04-29 RX ORDER — LIDOCAINE HCL/EPINEPHRINE/PF 2%-1:200K
VIAL (ML) INJECTION
Status: DISCONTINUED | OUTPATIENT
Start: 2025-04-29 | End: 2025-04-29 | Stop reason: SDUPTHER

## 2025-04-29 RX ORDER — OXYCODONE HYDROCHLORIDE 5 MG/1
5 TABLET ORAL EVERY 4 HOURS PRN
Status: DISCONTINUED | OUTPATIENT
Start: 2025-04-29 | End: 2025-05-02 | Stop reason: HOSPADM

## 2025-04-29 RX ORDER — IBUPROFEN 800 MG/1
800 TABLET, FILM COATED ORAL EVERY 8 HOURS
Status: DISCONTINUED | OUTPATIENT
Start: 2025-04-30 | End: 2025-04-30

## 2025-04-29 RX ORDER — ESMOLOL HYDROCHLORIDE 10 MG/ML
INJECTION INTRAVENOUS
Status: DISCONTINUED | OUTPATIENT
Start: 2025-04-29 | End: 2025-04-29 | Stop reason: SDUPTHER

## 2025-04-29 RX ORDER — FAMOTIDINE 10 MG/ML
20 INJECTION, SOLUTION INTRAVENOUS ONCE
Status: COMPLETED | OUTPATIENT
Start: 2025-04-29 | End: 2025-04-29

## 2025-04-29 RX ORDER — SODIUM CHLORIDE, SODIUM LACTATE, POTASSIUM CHLORIDE, CALCIUM CHLORIDE 600; 310; 30; 20 MG/100ML; MG/100ML; MG/100ML; MG/100ML
INJECTION, SOLUTION INTRAVENOUS CONTINUOUS
Status: DISCONTINUED | OUTPATIENT
Start: 2025-04-29 | End: 2025-05-02 | Stop reason: HOSPADM

## 2025-04-29 RX ORDER — KETOROLAC TROMETHAMINE 30 MG/ML
30 INJECTION, SOLUTION INTRAMUSCULAR; INTRAVENOUS EVERY 6 HOURS
Status: DISCONTINUED | OUTPATIENT
Start: 2025-04-29 | End: 2025-04-30

## 2025-04-29 RX ORDER — MORPHINE SULFATE 0.5 MG/ML
INJECTION, SOLUTION EPIDURAL; INTRATHECAL; INTRAVENOUS
Status: DISCONTINUED | OUTPATIENT
Start: 2025-04-29 | End: 2025-04-29 | Stop reason: SDUPTHER

## 2025-04-29 RX ORDER — DOCUSATE SODIUM 100 MG/1
100 CAPSULE, LIQUID FILLED ORAL 2 TIMES DAILY
Status: DISCONTINUED | OUTPATIENT
Start: 2025-04-29 | End: 2025-05-02 | Stop reason: HOSPADM

## 2025-04-29 RX ORDER — TRANEXAMIC ACID 10 MG/ML
1000 INJECTION, SOLUTION INTRAVENOUS
Status: DISCONTINUED | OUTPATIENT
Start: 2025-04-29 | End: 2025-05-02 | Stop reason: HOSPADM

## 2025-04-29 RX ORDER — SODIUM CHLORIDE, SODIUM LACTATE, POTASSIUM CHLORIDE, AND CALCIUM CHLORIDE .6; .31; .03; .02 G/100ML; G/100ML; G/100ML; G/100ML
500 INJECTION, SOLUTION INTRAVENOUS ONCE
Status: COMPLETED | OUTPATIENT
Start: 2025-04-29 | End: 2025-04-29

## 2025-04-29 RX ORDER — SODIUM CHLORIDE 0.9 % (FLUSH) 0.9 %
5-40 SYRINGE (ML) INJECTION EVERY 12 HOURS SCHEDULED
Status: DISCONTINUED | OUTPATIENT
Start: 2025-04-29 | End: 2025-05-02 | Stop reason: HOSPADM

## 2025-04-29 RX ORDER — METFORMIN HYDROCHLORIDE 500 MG/1
1000 TABLET, EXTENDED RELEASE ORAL 2 TIMES DAILY WITH MEALS
Status: DISCONTINUED | OUTPATIENT
Start: 2025-04-29 | End: 2025-05-02 | Stop reason: HOSPADM

## 2025-04-29 RX ORDER — LIDOCAINE HYDROCHLORIDE AND EPINEPHRINE 15; 5 MG/ML; UG/ML
INJECTION, SOLUTION EPIDURAL
Status: DISCONTINUED | OUTPATIENT
Start: 2025-04-29 | End: 2025-04-29 | Stop reason: SDUPTHER

## 2025-04-29 RX ORDER — SWAB
1 SWAB, NON-MEDICATED MISCELLANEOUS DAILY
Status: DISCONTINUED | OUTPATIENT
Start: 2025-04-29 | End: 2025-05-02 | Stop reason: HOSPADM

## 2025-04-29 RX ORDER — SODIUM CHLORIDE 0.9 % (FLUSH) 0.9 %
10 SYRINGE (ML) INJECTION PRN
Status: DISCONTINUED | OUTPATIENT
Start: 2025-04-29 | End: 2025-05-02 | Stop reason: HOSPADM

## 2025-04-29 RX ORDER — ONDANSETRON 2 MG/ML
4 INJECTION INTRAMUSCULAR; INTRAVENOUS EVERY 6 HOURS PRN
Status: DISCONTINUED | OUTPATIENT
Start: 2025-04-29 | End: 2025-04-29 | Stop reason: HOSPADM

## 2025-04-29 RX ORDER — ONDANSETRON 2 MG/ML
4 INJECTION INTRAMUSCULAR; INTRAVENOUS EVERY 6 HOURS PRN
Status: DISCONTINUED | OUTPATIENT
Start: 2025-04-29 | End: 2025-05-02 | Stop reason: HOSPADM

## 2025-04-29 RX ORDER — CITRIC ACID/SODIUM CITRATE 334-500MG
30 SOLUTION, ORAL ORAL ONCE
Status: COMPLETED | OUTPATIENT
Start: 2025-04-29 | End: 2025-04-29

## 2025-04-29 RX ORDER — KETOROLAC TROMETHAMINE 30 MG/ML
INJECTION, SOLUTION INTRAMUSCULAR; INTRAVENOUS
Status: DISCONTINUED | OUTPATIENT
Start: 2025-04-29 | End: 2025-04-29 | Stop reason: SDUPTHER

## 2025-04-29 RX ORDER — SODIUM CHLORIDE, SODIUM LACTATE, POTASSIUM CHLORIDE, AND CALCIUM CHLORIDE .6; .31; .03; .02 G/100ML; G/100ML; G/100ML; G/100ML
1000 INJECTION, SOLUTION INTRAVENOUS ONCE
Status: DISCONTINUED | OUTPATIENT
Start: 2025-04-29 | End: 2025-05-02 | Stop reason: HOSPADM

## 2025-04-29 RX ORDER — ACETAMINOPHEN 325 MG/1
975 TABLET ORAL ONCE
Status: COMPLETED | OUTPATIENT
Start: 2025-04-29 | End: 2025-04-29

## 2025-04-29 RX ADMIN — MORPHINE SULFATE 1.5 MG: 0.5 INJECTION, SOLUTION EPIDURAL; INTRATHECAL; INTRAVENOUS at 04:57

## 2025-04-29 RX ADMIN — LIDOCAINE HYDROCHLORIDE AND EPINEPHRINE 5 ML: 20; 5 INJECTION, SOLUTION EPIDURAL; INFILTRATION; INTRACAUDAL; PERINEURAL at 04:37

## 2025-04-29 RX ADMIN — SODIUM CHLORIDE, SODIUM LACTATE, POTASSIUM CHLORIDE, AND CALCIUM CHLORIDE 500 ML: .6; .31; .03; .02 INJECTION, SOLUTION INTRAVENOUS at 20:12

## 2025-04-29 RX ADMIN — ROPIVACAINE HYDROCHLORIDE 10 ML/HR: 2 INJECTION, SOLUTION EPIDURAL; INFILTRATION at 03:13

## 2025-04-29 RX ADMIN — ESMOLOL HYDROCHLORIDE 10 MG: 10 INJECTION, SOLUTION INTRAVENOUS at 04:55

## 2025-04-29 RX ADMIN — ESMOLOL HYDROCHLORIDE 20 MG: 10 INJECTION, SOLUTION INTRAVENOUS at 05:04

## 2025-04-29 RX ADMIN — FENTANYL CITRATE 15 MCG: 50 INJECTION, SOLUTION INTRAMUSCULAR; INTRAVENOUS at 03:02

## 2025-04-29 RX ADMIN — ONDANSETRON 4 MG: 2 INJECTION INTRAMUSCULAR; INTRAVENOUS at 04:28

## 2025-04-29 RX ADMIN — SODIUM CHLORIDE, POTASSIUM CHLORIDE, SODIUM LACTATE AND CALCIUM CHLORIDE: 600; 310; 30; 20 INJECTION, SOLUTION INTRAVENOUS at 04:19

## 2025-04-29 RX ADMIN — ACETAMINOPHEN 1000 MG: 500 TABLET ORAL at 17:58

## 2025-04-29 RX ADMIN — SODIUM CHLORIDE, PRESERVATIVE FREE 10 ML: 5 INJECTION INTRAVENOUS at 21:18

## 2025-04-29 RX ADMIN — DEXAMETHASONE SODIUM PHOSPHATE 8 MG: 4 INJECTION, SOLUTION INTRAMUSCULAR; INTRAVENOUS at 04:54

## 2025-04-29 RX ADMIN — INSULIN LISPRO 2 UNITS: 100 INJECTION, SOLUTION INTRAVENOUS; SUBCUTANEOUS at 23:20

## 2025-04-29 RX ADMIN — SODIUM CHLORIDE, SODIUM LACTATE, POTASSIUM CHLORIDE, AND CALCIUM CHLORIDE: .6; .31; .03; .02 INJECTION, SOLUTION INTRAVENOUS at 21:16

## 2025-04-29 RX ADMIN — FENTANYL CITRATE 85 MCG: 50 INJECTION, SOLUTION INTRAMUSCULAR; INTRAVENOUS at 03:10

## 2025-04-29 RX ADMIN — FAMOTIDINE 20 MG: 10 INJECTION, SOLUTION INTRAVENOUS at 04:28

## 2025-04-29 RX ADMIN — KETOROLAC TROMETHAMINE 30 MG: 30 INJECTION, SOLUTION INTRAMUSCULAR; INTRAVENOUS at 13:40

## 2025-04-29 RX ADMIN — LIDOCAINE HYDROCHLORIDE AND EPINEPHRINE 5 ML: 20; 5 INJECTION, SOLUTION EPIDURAL; INFILTRATION; INTRACAUDAL; PERINEURAL at 04:22

## 2025-04-29 RX ADMIN — DOCUSATE SODIUM 100 MG: 100 CAPSULE, LIQUID FILLED ORAL at 09:11

## 2025-04-29 RX ADMIN — SODIUM CITRATE AND CITRIC ACID MONOHYDRATE 30 ML: 500; 334 SOLUTION ORAL at 04:28

## 2025-04-29 RX ADMIN — ESMOLOL HYDROCHLORIDE 20 MG: 10 INJECTION, SOLUTION INTRAVENOUS at 05:00

## 2025-04-29 RX ADMIN — SODIUM CHLORIDE, POTASSIUM CHLORIDE, SODIUM LACTATE AND CALCIUM CHLORIDE: 600; 310; 30; 20 INJECTION, SOLUTION INTRAVENOUS at 05:06

## 2025-04-29 RX ADMIN — SODIUM CHLORIDE, POTASSIUM CHLORIDE, SODIUM LACTATE AND CALCIUM CHLORIDE: 600; 310; 30; 20 INJECTION, SOLUTION INTRAVENOUS at 13:15

## 2025-04-29 RX ADMIN — WATER 2000 MG: 1 INJECTION INTRAMUSCULAR; INTRAVENOUS; SUBCUTANEOUS at 04:29

## 2025-04-29 RX ADMIN — LIDOCAINE HYDROCHLORIDE AND EPINEPHRINE 5 ML: 20; 5 INJECTION, SOLUTION EPIDURAL; INFILTRATION; INTRACAUDAL; PERINEURAL at 04:30

## 2025-04-29 RX ADMIN — LIDOCAINE HYDROCHLORIDE,EPINEPHRINE BITARTRATE 2 ML: 15; .005 INJECTION, SOLUTION EPIDURAL; INFILTRATION; INTRACAUDAL; PERINEURAL at 03:07

## 2025-04-29 RX ADMIN — Medication 909 ML/HR: at 04:54

## 2025-04-29 RX ADMIN — INSULIN LISPRO 4 UNITS: 100 INJECTION, SOLUTION INTRAVENOUS; SUBCUTANEOUS at 13:39

## 2025-04-29 RX ADMIN — Medication 1 TABLET: at 09:11

## 2025-04-29 RX ADMIN — INSULIN LISPRO 4 UNITS: 100 INJECTION, SOLUTION INTRAVENOUS; SUBCUTANEOUS at 17:59

## 2025-04-29 RX ADMIN — INSULIN LISPRO 1 UNITS: 100 INJECTION, SOLUTION INTRAVENOUS; SUBCUTANEOUS at 01:11

## 2025-04-29 RX ADMIN — LIDOCAINE HYDROCHLORIDE,EPINEPHRINE BITARTRATE 3 ML: 15; .005 INJECTION, SOLUTION EPIDURAL; INFILTRATION; INTRACAUDAL; PERINEURAL at 03:03

## 2025-04-29 RX ADMIN — ESMOLOL HYDROCHLORIDE 20 MG: 10 INJECTION, SOLUTION INTRAVENOUS at 04:57

## 2025-04-29 RX ADMIN — LIDOCAINE HYDROCHLORIDE AND EPINEPHRINE 5 ML: 20; 5 INJECTION, SOLUTION EPIDURAL; INFILTRATION; INTRACAUDAL; PERINEURAL at 05:07

## 2025-04-29 RX ADMIN — AMPICILLIN SODIUM 1000 MG: 1 INJECTION, POWDER, FOR SOLUTION INTRAMUSCULAR; INTRAVENOUS at 00:09

## 2025-04-29 RX ADMIN — DOCUSATE SODIUM 100 MG: 100 CAPSULE, LIQUID FILLED ORAL at 21:14

## 2025-04-29 RX ADMIN — KETOROLAC TROMETHAMINE 30 MG: 30 INJECTION, SOLUTION INTRAMUSCULAR; INTRAVENOUS at 05:10

## 2025-04-29 RX ADMIN — FENTANYL CITRATE 80 MCG: 50 INJECTION, SOLUTION INTRAMUSCULAR; INTRAVENOUS at 03:10

## 2025-04-29 RX ADMIN — ACETAMINOPHEN 975 MG: 325 TABLET ORAL at 04:28

## 2025-04-29 RX ADMIN — METFORMIN HYDROCHLORIDE 1000 MG: 500 TABLET, EXTENDED RELEASE ORAL at 17:59

## 2025-04-29 RX ADMIN — ENOXAPARIN SODIUM 40 MG: 100 INJECTION SUBCUTANEOUS at 18:05

## 2025-04-29 RX ADMIN — ONDANSETRON 4 MG: 2 INJECTION INTRAMUSCULAR; INTRAVENOUS at 04:42

## 2025-04-29 RX ADMIN — AZITHROMYCIN 500 MG: 500 INJECTION, POWDER, LYOPHILIZED, FOR SOLUTION INTRAVENOUS at 04:41

## 2025-04-29 RX ADMIN — INSULIN HUMAN 2 UNITS: 100 INJECTION, SOLUTION PARENTERAL at 13:59

## 2025-04-29 RX ADMIN — INSULIN HUMAN 2 UNITS: 100 INJECTION, SOLUTION PARENTERAL at 18:30

## 2025-04-29 RX ADMIN — KETOROLAC TROMETHAMINE 30 MG: 30 INJECTION, SOLUTION INTRAMUSCULAR; INTRAVENOUS at 21:16

## 2025-04-29 RX ADMIN — DEXTROSE AND SODIUM CHLORIDE: 5; .45 INJECTION, SOLUTION INTRAVENOUS at 01:03

## 2025-04-29 RX ADMIN — METFORMIN HYDROCHLORIDE 1000 MG: 500 TABLET, EXTENDED RELEASE ORAL at 13:41

## 2025-04-29 ASSESSMENT — PAIN - FUNCTIONAL ASSESSMENT
PAIN_FUNCTIONAL_ASSESSMENT: ACTIVITIES ARE NOT PREVENTED
PAIN_FUNCTIONAL_ASSESSMENT: ACTIVITIES ARE NOT PREVENTED

## 2025-04-29 ASSESSMENT — PAIN SCALES - GENERAL
PAINLEVEL_OUTOF10: 0

## 2025-04-29 NOTE — PROGRESS NOTES
Hannah progressed to an anterior lip but with repositioning and intermittent attempts to push, she is unable to arrive at complete dilatation nor descend more than +1 station.  The position of the baby is unclear due to caput but I suspect the child is OP and given that she is an uncontrolled diabetic with short stature and now with Cat 2 tracing with some intermittent decels some of which are late, I think  Section is best. R/B discussed with the patient and FOB and she agrees to proceed. Anesthesia is notified. Pitocin is stopped. Consent is signed. All questions are answered. Will prepare the back.

## 2025-04-29 NOTE — FLOWSHEET NOTE
Pt cleaned up in PACU and pads changed. Pt to MB 10 via bed with baby pushed in crib by FOB. Pt oriented to room, call light within reach. Report given to Tamra CARPENTER nurse.

## 2025-04-29 NOTE — FLOWSHEET NOTE
RN notified Alessandra WALLACE of blood glucose level of 207. 4units of humalog given subcutaneous in left arm and 2 units regular insulin given IVP. No new orders at this time

## 2025-04-29 NOTE — CONSULTS
Session ID: [id number]  Language: Monegasque Cremerari   ID: #[id number]   Name: Dafne VOGEL  Plan of care discussed

## 2025-04-29 NOTE — CONSULTS
Session ID: [id number]  Language: [place] Leoneandarvin Heath   ID: #[id number]   Name: Samreen  Plan of care discussed

## 2025-04-29 NOTE — CARE COORDINATION
LSW in to see mother of baby due to needing crib and car seat. LSW met with mother of baby, FOB, and infant at bedside, introduced self and role. RN Tamra at bedside and had AMN  in room, LSW used this  to complete assessment. Infant girl: Bozena is mother of baby's 1st child, lives with FOB at home. Mother of baby does not have all necessary supplies for infant, needs crib and car seat. Discharging RN okay to provide mother of baby with car seat at discharge. LSW provided mother of baby with [employer], went over safe sleep information. Mother of baby is planning on both bottle and breast feeding, is connected with [place]. Mother of baby has regular transportation. Mother of baby is planning on using [facility] for pediatric care. Mother of baby denies history of anxiety or depression. LSW discussed baby blues versus postpartum depression with mother of baby, discussed warning signs and symptoms of postpartum depression. Mother of baby verbalized understanding of above. No other questions or needs stated at this time. LSW to remain available.

## 2025-04-29 NOTE — CONSULTS
Session ID: [id number]  Language: Palestinian Creole   ID: #[id number]   Name: Elle   pLAN OF CARE DISCUSSED

## 2025-04-29 NOTE — OP NOTE
PATIENT:    Hannah Vaz    PREOPERATIVE DIAGNOSES:  1.  38w1d        2.failure to progress     POSTOPERATIVE DIAGNOSES:  Same      PROCEDURE:     Primary low transverse  section.      SURGEON:     Mark CEVALLOS    ASSISTANT    Abbey PAREKH BLOOD LOSS:   474cc      COMPLICATIONS:     None.         ANESTHESIA:    Spinal.         FINDINGS:   Live female         Infant Apgars 8 and 9 at 1 and 5 min respectively.      Weight: 7 lbs 5 oz.        Normal tubes and ovaries bilaterally.         DETAILS OF PROCEDURE:   Patient was taken to the operating room and time out was documented. She was then placed in the supine position slightly tilted to the left. Abdomen was prepped and draped in the usual manner.Anesthesia level was checked and was found to be adequate. The abdomen was entered thru a pfannensteil incision and carried down sharply to the fascia. The fascia was entered in the same plane as the skin incision and  from the underlying rectus abdominis muscles which were  in the midline exposing the parietal peritoneum. The peritoneum was opened sharply in a longitudinal fashion. A bladder retractor was placed in position and the visceral peritoneum overlying the lower uterine segment was opened transversely and a bladder flap was developed in a sharp manner. The lower uterine segment was opened in a low transverse fashion. The amniotic cavity was entered and Clear amniotic fluid was suctioned out. The baby was then delivered from the Cephalic presentation .Cord was clamped and cut and the baby was handed over to the nursery nurse. Cord blood was saved. The placenta was then removed manually and the endometrial cavity was swept clean by a wet lap. The edges of the uterine incision were grasped by Allis clamps and the incision itself was closed using a continuous locked suture of 0 vicryl. Additional figure of 8 sutures of o vicryl were placed as needed for hemostasis.   Tubes and

## 2025-04-29 NOTE — ANESTHESIA PROCEDURE NOTES
Epidural Block    Patient location during procedure: OB  Start time: 2025 2:55 AM  Reason for block: labor epidural  Staffing  Performed: other anesthesia staff   Anesthesiologist: Alberto Dugan MD  Resident/CRNA: Blanca Irwin APRN - CRNA  Performed by: Blanca Irwin APRN - CRNA  Authorized by: Alberto Dugan MD    Epidural  Patient position: sitting  Prep: ChloraPrep  Patient monitoring: cardiac monitor, continuous pulse ox and frequent blood pressure checks  Approach: midline  Location: L3-4  Injection technique: CHANEL saline  Provider prep: mask and sterile gloves  Needle  Needle type: Tuohy   Needle gauge: 17 G  Needle length: 3.5 in  Needle insertion depth: 7.5 cm  Catheter type: multi-orifice  Catheter size: 19 G (20 G)  Catheter at skin depth: 12 cm  Test dose: negativeCatheter Secured: tegaderm  Assessment  Sensory level: T10  Hemodynamics: stable  Attempts: 1  Outcomes: uncomplicated and patient tolerated procedure well  Additional Notes  Called for labor epidural analgesia request. Medical and Surgical history reviewed with pt. Risks/benefits of epidural discussed including allergic reaction, infection, bleeding, hypotension, headache, back pain, nerve damage, failed or one-sided block. Also discussed anesthesia options and associated risks in the event of . Questions answered. Verbalizes understanding and requests to proceed.   FHR:  139  Pt in sitting position. Labor epidural placed using CHANEL sterile technique (donned mask and sterile gloves). Back prepped with Chloraprepx2. Sterile drape applied.  Site: L3-4  CHANEL:   75 cm.   Attempts:  1   Re-directs:  0  Site infiltrated with 1%Lidocaine. 17G Tuohy needle inserted, CHANEL technique with saline.   No heme, CSF, or paresthesias noted. Epidural space dilated with saline. #25ga pencan needle placed through tuohy for dural puncture. +CSF -heme or paresthesia. 15 mcg fentanyl given. Spinal needle removed.Threaded epidural catheter

## 2025-04-29 NOTE — CONSULTS
Session ID: [id number]  Language: Mauritaniandarvin Heath   ID: #[id number]   Name: Liza  Plan of care discussed

## 2025-04-29 NOTE — PLAN OF CARE
Problem: Chronic Conditions and Co-morbidities  Goal: Patient's chronic conditions and co-morbidity symptoms are monitored and maintained or improved  Outcome: Not Progressing  Flowsheets (Taken 4/29/2025 0530 by Isa Garcia, RN)  Care Plan - Patient's Chronic Conditions and Co-Morbidity Symptoms are Monitored and Maintained or Improved:   Monitor and assess patient's chronic conditions and comorbid symptoms for stability, deterioration, or improvement   Collaborate with multidisciplinary team to address chronic and comorbid conditions and prevent exacerbation or deterioration   Update acute care plan with appropriate goals if chronic or comorbid symptoms are exacerbated and prevent overall improvement and discharge     Problem: Chronic Conditions and Co-morbidities  Goal: Patient's chronic conditions and co-morbidity symptoms are monitored and maintained or improved  Outcome: Not Progressing  Flowsheets (Taken 4/29/2025 0530 by Isa Garcia, RN)  Care Plan - Patient's Chronic Conditions and Co-Morbidity Symptoms are Monitored and Maintained or Improved:   Monitor and assess patient's chronic conditions and comorbid symptoms for stability, deterioration, or improvement   Collaborate with multidisciplinary team to address chronic and comorbid conditions and prevent exacerbation or deterioration   Update acute care plan with appropriate goals if chronic or comorbid symptoms are exacerbated and prevent overall improvement and discharge

## 2025-04-30 LAB
ERYTHROCYTE [DISTWIDTH] IN BLOOD BY AUTOMATED COUNT: 15.6 % (ref 11.7–14.9)
GLUCOSE BLD-MCNC: 68 MG/DL (ref 74–99)
GLUCOSE BLD-MCNC: 85 MG/DL (ref 74–99)
GLUCOSE BLD-MCNC: 98 MG/DL (ref 74–99)
GLUCOSE BLD-MCNC: 98 MG/DL (ref 74–99)
GLUCOSE BLD-MCNC: 99 MG/DL (ref 74–99)
HCT VFR BLD AUTO: 29.9 % (ref 37–47)
HGB BLD-MCNC: 9.5 G/DL (ref 12.5–16)
MCH RBC QN AUTO: 25.8 PG (ref 27–31)
MCHC RBC AUTO-ENTMCNC: 31.8 G/DL (ref 32–36)
MCV RBC AUTO: 81.3 FL (ref 78–100)
PLATELET # BLD AUTO: 255 K/UL (ref 140–440)
PMV BLD AUTO: 10.6 FL (ref 7.5–11.1)
RBC # BLD AUTO: 3.68 M/UL (ref 4.2–5.4)
WBC OTHER # BLD: 15.1 K/UL (ref 4–10.5)

## 2025-04-30 PROCEDURE — 82962 GLUCOSE BLOOD TEST: CPT

## 2025-04-30 PROCEDURE — 6360000002 HC RX W HCPCS: Performed by: OBSTETRICS & GYNECOLOGY

## 2025-04-30 PROCEDURE — APPNB30 APP NON BILLABLE TIME 0-30 MINS

## 2025-04-30 PROCEDURE — 2580000003 HC RX 258: Performed by: OBSTETRICS & GYNECOLOGY

## 2025-04-30 PROCEDURE — 36415 COLL VENOUS BLD VENIPUNCTURE: CPT

## 2025-04-30 PROCEDURE — 1220000000 HC SEMI PRIVATE OB R&B

## 2025-04-30 PROCEDURE — 6370000000 HC RX 637 (ALT 250 FOR IP): Performed by: OBSTETRICS & GYNECOLOGY

## 2025-04-30 PROCEDURE — 6370000000 HC RX 637 (ALT 250 FOR IP): Performed by: INTERNAL MEDICINE

## 2025-04-30 PROCEDURE — 85027 COMPLETE CBC AUTOMATED: CPT

## 2025-04-30 RX ORDER — INSULIN GLARGINE 100 [IU]/ML
10 INJECTION, SOLUTION SUBCUTANEOUS NIGHTLY
Status: DISCONTINUED | OUTPATIENT
Start: 2025-04-30 | End: 2025-05-02 | Stop reason: HOSPADM

## 2025-04-30 RX ORDER — INSULIN GLARGINE 100 [IU]/ML
10 INJECTION, SOLUTION SUBCUTANEOUS ONCE
Status: COMPLETED | OUTPATIENT
Start: 2025-04-30 | End: 2025-04-30

## 2025-04-30 RX ORDER — IBUPROFEN 800 MG/1
800 TABLET, FILM COATED ORAL EVERY 8 HOURS
Status: DISCONTINUED | OUTPATIENT
Start: 2025-04-30 | End: 2025-05-02 | Stop reason: HOSPADM

## 2025-04-30 RX ORDER — INSULIN LISPRO 100 [IU]/ML
0-12 INJECTION, SOLUTION INTRAVENOUS; SUBCUTANEOUS
Status: DISCONTINUED | OUTPATIENT
Start: 2025-04-30 | End: 2025-05-01

## 2025-04-30 RX ADMIN — DOCUSATE SODIUM 100 MG: 100 CAPSULE, LIQUID FILLED ORAL at 20:44

## 2025-04-30 RX ADMIN — OXYCODONE HYDROCHLORIDE 10 MG: 5 TABLET ORAL at 14:22

## 2025-04-30 RX ADMIN — IBUPROFEN 800 MG: 800 TABLET, FILM COATED ORAL at 06:13

## 2025-04-30 RX ADMIN — INSULIN GLARGINE 10 UNITS: 100 INJECTION, SOLUTION SUBCUTANEOUS at 00:36

## 2025-04-30 RX ADMIN — OXYCODONE HYDROCHLORIDE 10 MG: 5 TABLET ORAL at 08:03

## 2025-04-30 RX ADMIN — Medication 1 TABLET: at 09:16

## 2025-04-30 RX ADMIN — ACETAMINOPHEN 1000 MG: 500 TABLET ORAL at 11:12

## 2025-04-30 RX ADMIN — ACETAMINOPHEN 1000 MG: 500 TABLET ORAL at 02:06

## 2025-04-30 RX ADMIN — IBUPROFEN 800 MG: 800 TABLET, FILM COATED ORAL at 23:21

## 2025-04-30 RX ADMIN — DOCUSATE SODIUM 100 MG: 100 CAPSULE, LIQUID FILLED ORAL at 09:16

## 2025-04-30 RX ADMIN — IBUPROFEN 800 MG: 800 TABLET, FILM COATED ORAL at 14:22

## 2025-04-30 RX ADMIN — OXYCODONE HYDROCHLORIDE 10 MG: 5 TABLET ORAL at 20:44

## 2025-04-30 RX ADMIN — ACETAMINOPHEN 1000 MG: 500 TABLET ORAL at 18:28

## 2025-04-30 RX ADMIN — ENOXAPARIN SODIUM 40 MG: 100 INJECTION SUBCUTANEOUS at 09:16

## 2025-04-30 RX ADMIN — SODIUM CHLORIDE, SODIUM LACTATE, POTASSIUM CHLORIDE, AND CALCIUM CHLORIDE: .6; .31; .03; .02 INJECTION, SOLUTION INTRAVENOUS at 01:17

## 2025-04-30 ASSESSMENT — PAIN SCALES - GENERAL
PAINLEVEL_OUTOF10: 5
PAINLEVEL_OUTOF10: 7
PAINLEVEL_OUTOF10: 7

## 2025-04-30 ASSESSMENT — PAIN DESCRIPTION - LOCATION
LOCATION: ABDOMEN;INCISION
LOCATION: ABDOMEN;INCISION

## 2025-04-30 ASSESSMENT — PAIN DESCRIPTION - ORIENTATION
ORIENTATION: LOWER
ORIENTATION: LOWER

## 2025-04-30 ASSESSMENT — PAIN DESCRIPTION - DESCRIPTORS
DESCRIPTORS: ACHING;SORE
DESCRIPTORS: ACHING;SORE

## 2025-04-30 NOTE — PLAN OF CARE
Problem: Chronic Conditions and Co-morbidities  Goal: Patient's chronic conditions and co-morbidity symptoms are monitored and maintained or improved  4/30/2025 1933 by Nakita Grant RN  Outcome: Progressing  4/30/2025 0953 by Yasmin Adan RN  Outcome: Progressing     Problem: Pain  Goal: Verbalizes/displays adequate comfort level or baseline comfort level  4/30/2025 1933 by Nakita Grant RN  Outcome: Progressing  4/30/2025 0953 by Yasmin Adan RN  Outcome: Progressing  Flowsheets (Taken 4/30/2025 0742)  Verbalizes/displays adequate comfort level or baseline comfort level:   Encourage patient to monitor pain and request assistance   Assess pain using appropriate pain scale   Administer analgesics based on type and severity of pain and evaluate response   Implement non-pharmacological measures as appropriate and evaluate response   Consider cultural and social influences on pain and pain management   Notify Licensed Independent Practitioner if interventions unsuccessful or patient reports new pain     Problem: Infection - Adult  Goal: Absence of infection at discharge  4/30/2025 1933 by Nakita Grant RN  Outcome: Progressing  4/30/2025 0953 by Yasmin Adan RN  Outcome: Progressing  Goal: Absence of infection during hospitalization  4/30/2025 1933 by Nakita Grant RN  Outcome: Progressing  4/30/2025 0953 by Yasmin Adan RN  Outcome: Progressing  Goal: Absence of fever/infection during anticipated neutropenic period  4/30/2025 1933 by Nakita Grant RN  Outcome: Progressing  4/30/2025 0953 by Yasmin Adan RN  Outcome: Progressing     Problem: Safety - Adult  Goal: Free from fall injury  4/30/2025 1933 by Nakita Grant RN  Outcome: Progressing  4/30/2025 0953 by Yasmin Adan RN  Outcome: Progressing  Flowsheets (Taken 4/30/2025 0742)  Free From Fall Injury:   Instruct family/caregiver on patient safety   Based on caregiver fall risk screen, instruct

## 2025-04-30 NOTE — CONSULTS
Session ID: [id number]  Language: [place] Timorese Cremerari   ID: #[id number]   Name: Cherie    Language line used. Pt up to the bathroom and is able to void. Showed pt how to do uli care and pt does herself. Pt states she believes she emptied her bladder.

## 2025-04-30 NOTE — CONSULTS
Session ID: [id number]  Language: Spencer Heath   ID: #[id number]   Name: Cici    Language line used for assessment. Pt up to the bathroom, unable to void at this time. Michelle Will CNM notified pt now able to void, will try again in an hour.

## 2025-04-30 NOTE — PLAN OF CARE
Problem: Chronic Conditions and Co-morbidities  Goal: Patient's chronic conditions and co-morbidity symptoms are monitored and maintained or improved  2025 by Mariama Murillo RN  Outcome: Progressing  2025 by Tamra Shen RN  Outcome: Not Progressing  Flowsheets (Taken 2025 0530 by Isa Garcia, RN)  Care Plan - Patient's Chronic Conditions and Co-Morbidity Symptoms are Monitored and Maintained or Improved:   Monitor and assess patient's chronic conditions and comorbid symptoms for stability, deterioration, or improvement   Collaborate with multidisciplinary team to address chronic and comorbid conditions and prevent exacerbation or deterioration   Update acute care plan with appropriate goals if chronic or comorbid symptoms are exacerbated and prevent overall improvement and discharge     Problem: Vaginal Birth or  Section  Goal: Fetal and maternal status remain reassuring during the birth process  Description:  Birth OB-Pregnancy care plan goal which identifies if the fetal and maternal status remain reassuring during the birth process  2025 by Mariama Murillo RN  Outcome: Progressing  2025 by Tamra Shen RN  Outcome: Progressing     Problem: Pain  Goal: Verbalizes/displays adequate comfort level or baseline comfort level  2025 by Mariama Murillo RN  Outcome: Progressing  Flowsheets (Taken 2025 2330)  Verbalizes/displays adequate comfort level or baseline comfort level:   Encourage patient to monitor pain and request assistance   Assess pain using appropriate pain scale   Administer analgesics based on type and severity of pain and evaluate response   Consider cultural and social influences on pain and pain management   Implement non-pharmacological measures as appropriate and evaluate response   Notify Licensed Independent Practitioner if interventions unsuccessful or patient reports new pain  2025 by Tamra Shen

## 2025-04-30 NOTE — CONSULTS
Endocrinology   Consult Note  2025  3:15 PM     Primary Care provider: No primary care provider on file.     Referring physician:  Nick Diamond MD     Dear Inocencia     Thank You for the Consult     Pt. Was Admitted for : Scheduled fetal nonstress test due to diabetes mellitus uncontrolled    Reason for Consult: Patient had  had the baby      History Obtained From:  Patient/ EMR       HISTORY OF PRESENT ILLNESS:                The patient is a 29 y.o. female with significant past medical history of diabetes mellitus during pregnancy admitted to hospital for fetal stress test and decided to deliver the baby with  2020 with gestation.  Patient had been running high blood glucose ls during pregnancy.  Patient not known to be diabetic before pregnancy.  I was  consulted for better control of blood levetic before pregnancy.       ROS:   Pt's ROS done in detail.  Abnormal ROS are noted in Medical and Surgical History Section below:     Other Medical History:        Diagnosis Date    Diabetes type 2 (HCC)      Surgical History:        Procedure Laterality Date     SECTION N/A 2025     SECTION performed by Mikel Flores MD at [facility] L&D OR       Allergies:  Patient has no known allergies.    Family History:   History reviewed. No pertinent family history.  REVIEW OF SYSTEMS:  Review of System Done as noted above     PHYSICAL EXAM:      Vitals:    BP (!) 132/98   Pulse (!) 125 Comment: asymptomatic  Temp 98.8 °F (37.1 °C) (Oral)   Resp 17   SpO2 95%   Breastfeeding Unknown     CONSTITUTIONAL:  awake, alert, cooperative, appears stated age   EYES:  vision intact Fundoscopic Exam not performed   ENT:Normal  NECK:  Supple, No JVD.   Thyroid Exam:Normal   LUNGS:  Has Vesicular Breath Sounds,   CARDIOVASCULAR:  Normal apical impulse, regular rate and rhythm, normal S1 and S2, no S3 or S4, and has no  murmur   ABDOMEN:  No scars, normal bowel sounds,

## 2025-04-30 NOTE — ANESTHESIA POSTPROCEDURE EVALUATION
Department of Anesthesiology  Postprocedure Note    Patient: Hannah Vaz  MRN: [id number]  YOB: 1995  Date of evaluation: 2025    Procedure Summary       Date: 25 Room / Location: [facility] OR [address] [place] [address]    Anesthesia Start: 255 Anesthesia Stop: 528    Procedures:        SECTION (Abdomen)      Labor Analgesia Diagnosis:       Labor and delivery indication for care or intervention      (Labor and delivery indication for care or intervention [O75.9])    Surgeons: Mikel Flores MD Responsible Provider: Alberto Dugan MD    Anesthesia Type: Epidural ASA Status: 2 - Emergent            Anesthesia Type: Epidural    Hailee Phase I: Hailee Score: 9    Hailee Phase II: Hailee Score: 10    Anesthesia Post Evaluation    Patient location during evaluation: floor  Patient participation: complete - patient participated  Level of consciousness: awake  Airway patency: patent  Nausea & Vomiting: no nausea  Cardiovascular status: blood pressure returned to baseline  Respiratory status: acceptable  Hydration status: euvolemic  Multimodal analgesia pain management approach  Pain management: adequate    No notable events documented.

## 2025-04-30 NOTE — PROGRESS NOTES
Department of Obstetrics and Gynecology  Labor and Delivery   Post Partum Progress Note      SUBJECTIVE:  Doing well with no complaints. Ambulating throughout room without dizziness or other s/s of anemia. Reports bleeding is decreasing and pain is well controlled with medication. Denies pain or discharge at incisional site. Has voided without difficulty. Has not had BM, + flatus. Eating and drinking well. Denies HA/visual changes/epigastric pain. Bottlefeeding is going well. Reports good social support. Denies emotional concerns at this time.     OBJECTIVE:      Vitals:  /80   Pulse (!) 108   Temp 99.1 °F (37.3 °C) (Oral)   Resp 18   SpO2 96%   Breastfeeding Unknown   Lab Results   Component Value Date    WBC 15.1 (H) 04/30/2025    HGB 9.5 (L) 04/30/2025    HCT 29.9 (L) 04/30/2025    MCV 81.3 04/30/2025     04/30/2025       CONSTITUTIONAL: generally well-appearing.   ABDOMEN:  Soft, well contracted uterus. Fundus firm at u-1. C/s incision c/d/i. No erythema or discharge noted. BS present x 4 quadrants.   LOCHIA: Normal per pt  LUNGS: CTAB  HEART: RRR,   EXTREMITIES: No calf tenderness, erythema or swelling bilaterally       ASSESSMENT:      PPD # 1  S/p C/s  Ghtn  Uncontrolled Type 2 DM- Dr Abraham co-managing   Bottlefeeding well  GBS Positive  RH B+  Anemia- On iron    PLAN:     Will Continue with PP POC   Will plan for discharge on PPD3.      Time Spent 10      SUE Packer - RODRIGO

## 2025-04-30 NOTE — PLAN OF CARE
Problem: Chronic Conditions and Co-morbidities  Goal: Patient's chronic conditions and co-morbidity symptoms are monitored and maintained or improved  2025 by Yasmin Adan RN  Outcome: Progressing  2025 by Mariama Murillo RN  Outcome: Progressing     Problem: Vaginal Birth or  Section  Goal: Fetal and maternal status remain reassuring during the birth process  Description:  Birth OB-Pregnancy care plan goal which identifies if the fetal and maternal status remain reassuring during the birth process  2025 by Yasmin Adan RN  Outcome: Progressing  2025 by Mariama Murillo RN  Outcome: Progressing     Problem: Pain  Goal: Verbalizes/displays adequate comfort level or baseline comfort level  2025 by Yasmin Adan RN  Outcome: Progressing  Flowsheets (Taken 2025 0742)  Verbalizes/displays adequate comfort level or baseline comfort level:   Encourage patient to monitor pain and request assistance   Assess pain using appropriate pain scale   Administer analgesics based on type and severity of pain and evaluate response   Implement non-pharmacological measures as appropriate and evaluate response   Consider cultural and social influences on pain and pain management   Notify Licensed Independent Practitioner if interventions unsuccessful or patient reports new pain  2025 by Mariama Murillo RN  Outcome: Progressing  Flowsheets (Taken 2025 2330)  Verbalizes/displays adequate comfort level or baseline comfort level:   Encourage patient to monitor pain and request assistance   Assess pain using appropriate pain scale   Administer analgesics based on type and severity of pain and evaluate response   Consider cultural and social influences on pain and pain management   Implement non-pharmacological measures as appropriate and evaluate response   Notify Licensed Independent Practitioner if interventions unsuccessful or patient

## 2025-05-01 LAB
GLUCOSE BLD-MCNC: 108 MG/DL (ref 74–99)
GLUCOSE BLD-MCNC: 116 MG/DL (ref 74–99)
GLUCOSE BLD-MCNC: 93 MG/DL (ref 74–99)
HCT VFR BLD AUTO: 31.5 % (ref 37–47)
HGB BLD-MCNC: 10.1 G/DL (ref 12.5–16)

## 2025-05-01 PROCEDURE — 85018 HEMOGLOBIN: CPT

## 2025-05-01 PROCEDURE — 6370000000 HC RX 637 (ALT 250 FOR IP): Performed by: OBSTETRICS & GYNECOLOGY

## 2025-05-01 PROCEDURE — 6370000000 HC RX 637 (ALT 250 FOR IP): Performed by: ADVANCED PRACTICE MIDWIFE

## 2025-05-01 PROCEDURE — 82962 GLUCOSE BLOOD TEST: CPT

## 2025-05-01 PROCEDURE — 85014 HEMATOCRIT: CPT

## 2025-05-01 PROCEDURE — 6360000002 HC RX W HCPCS: Performed by: OBSTETRICS & GYNECOLOGY

## 2025-05-01 PROCEDURE — 1220000000 HC SEMI PRIVATE OB R&B

## 2025-05-01 PROCEDURE — 36415 COLL VENOUS BLD VENIPUNCTURE: CPT

## 2025-05-01 RX ORDER — SIMETHICONE 80 MG
80 TABLET,CHEWABLE ORAL EVERY 6 HOURS PRN
Status: DISCONTINUED | OUTPATIENT
Start: 2025-05-01 | End: 2025-05-02 | Stop reason: HOSPADM

## 2025-05-01 RX ORDER — INSULIN LISPRO 100 [IU]/ML
0-8 INJECTION, SOLUTION INTRAVENOUS; SUBCUTANEOUS
Status: DISCONTINUED | OUTPATIENT
Start: 2025-05-01 | End: 2025-05-02 | Stop reason: HOSPADM

## 2025-05-01 RX ORDER — LABETALOL 100 MG/1
100 TABLET, FILM COATED ORAL EVERY 12 HOURS SCHEDULED
Status: DISCONTINUED | OUTPATIENT
Start: 2025-05-01 | End: 2025-05-02 | Stop reason: HOSPADM

## 2025-05-01 RX ADMIN — Medication 1 TABLET: at 08:44

## 2025-05-01 RX ADMIN — LABETALOL HYDROCHLORIDE 100 MG: 100 TABLET, FILM COATED ORAL at 12:06

## 2025-05-01 RX ADMIN — INSULIN LISPRO 0 UNITS: 100 INJECTION, SOLUTION INTRAVENOUS; SUBCUTANEOUS at 21:56

## 2025-05-01 RX ADMIN — IBUPROFEN 800 MG: 800 TABLET, FILM COATED ORAL at 21:00

## 2025-05-01 RX ADMIN — ENOXAPARIN SODIUM 40 MG: 100 INJECTION SUBCUTANEOUS at 08:45

## 2025-05-01 RX ADMIN — ACETAMINOPHEN 1000 MG: 500 TABLET ORAL at 10:22

## 2025-05-01 RX ADMIN — LABETALOL HYDROCHLORIDE 100 MG: 100 TABLET, FILM COATED ORAL at 21:00

## 2025-05-01 RX ADMIN — DOCUSATE SODIUM 100 MG: 100 CAPSULE, LIQUID FILLED ORAL at 08:44

## 2025-05-01 RX ADMIN — IBUPROFEN 800 MG: 800 TABLET, FILM COATED ORAL at 06:56

## 2025-05-01 RX ADMIN — ACETAMINOPHEN 1000 MG: 500 TABLET ORAL at 18:21

## 2025-05-01 RX ADMIN — IBUPROFEN 800 MG: 800 TABLET, FILM COATED ORAL at 14:11

## 2025-05-01 RX ADMIN — MAGNESIUM HYDROXIDE 30 ML: 400 SUSPENSION ORAL at 10:47

## 2025-05-01 RX ADMIN — DOCUSATE SODIUM 100 MG: 100 CAPSULE, LIQUID FILLED ORAL at 21:00

## 2025-05-01 RX ADMIN — ACETAMINOPHEN 1000 MG: 500 TABLET ORAL at 02:33

## 2025-05-01 RX ADMIN — FERROUS SULFATE TAB 325 MG (65 MG ELEMENTAL FE) 325 MG: 325 (65 FE) TAB at 08:44

## 2025-05-01 RX ADMIN — OXYCODONE HYDROCHLORIDE 10 MG: 5 TABLET ORAL at 18:21

## 2025-05-01 RX ADMIN — OXYCODONE HYDROCHLORIDE 10 MG: 5 TABLET ORAL at 08:44

## 2025-05-01 ASSESSMENT — PAIN DESCRIPTION - LOCATION
LOCATION: ABDOMEN;INCISION
LOCATION: ABDOMEN
LOCATION: ABDOMEN;INCISION

## 2025-05-01 ASSESSMENT — PAIN DESCRIPTION - DESCRIPTORS
DESCRIPTORS: ACHING;SORE
DESCRIPTORS: ACHING
DESCRIPTORS: ACHING;SORE

## 2025-05-01 ASSESSMENT — PAIN SCALES - GENERAL
PAINLEVEL_OUTOF10: 1
PAINLEVEL_OUTOF10: 7
PAINLEVEL_OUTOF10: 5
PAINLEVEL_OUTOF10: 4
PAINLEVEL_OUTOF10: 5
PAINLEVEL_OUTOF10: 1

## 2025-05-01 ASSESSMENT — PAIN DESCRIPTION - ORIENTATION
ORIENTATION: LOWER

## 2025-05-01 NOTE — PLAN OF CARE
Problem: Chronic Conditions and Co-morbidities  Goal: Patient's chronic conditions and co-morbidity symptoms are monitored and maintained or improved  5/1/2025 0758 by Yasmin Adan RN  Outcome: Progressing  4/30/2025 1933 by Nakita Grant RN  Outcome: Progressing     Problem: Pain  Goal: Verbalizes/displays adequate comfort level or baseline comfort level  5/1/2025 0758 by Yasmin Adan RN  Outcome: Progressing  Flowsheets (Taken 4/30/2025 2035 by Nakita Grant RN)  Verbalizes/displays adequate comfort level or baseline comfort level:   Encourage patient to monitor pain and request assistance   Assess pain using appropriate pain scale   Administer analgesics based on type and severity of pain and evaluate response  4/30/2025 1933 by Nakita Grant RN  Outcome: Progressing     Problem: Infection - Adult  Goal: Absence of infection at discharge  5/1/2025 0758 by Yasmin Adan RN  Outcome: Progressing  4/30/2025 1933 by Nakita Grant RN  Outcome: Progressing  Goal: Absence of infection during hospitalization  5/1/2025 0758 by Yasmin Adan RN  Outcome: Progressing  4/30/2025 1933 by Nakita Grant RN  Outcome: Progressing  Goal: Absence of fever/infection during anticipated neutropenic period  5/1/2025 0758 by Yasmin Adan RN  Outcome: Progressing  4/30/2025 1933 by Nakita Grant RN  Outcome: Progressing     Problem: Safety - Adult  Goal: Free from fall injury  5/1/2025 0758 by Yasmin Adan RN  Outcome: Progressing  Flowsheets (Taken 4/30/2025 2035 by Nakita Grant RN)  Free From Fall Injury: Instruct family/caregiver on patient safety  4/30/2025 1933 by Nakita Grant RN  Outcome: Progressing

## 2025-05-01 NOTE — CONSULTS
Session ID: [id number]  Language: Brazilian Creole   ID: #[id number]   Name: Brad Romo    Discussed with Dr. Flores regarding pt vital signs, pt states difficulty with breathing when ambulating and abdomen slightly distended. Orders received. Heating pad given to pt and explained how to use and to be sure that infant will not come in contact with heating pad. Informed pt that she will be started on medication for her blood pressure and pt verbalizes understanding. Also informed pt that she will be having blood work drawn as well. Pt verbalizes understanding.

## 2025-05-01 NOTE — PROGRESS NOTES
Language line used.  [place] #[id number]. Assessment completed. Pt states she is getting up and voiding without difficulty but when she is up she says it is more difficult to breathe. Assessment completed. Abdomen distended. Pt states she is passing flatus. Denies any headache, denies double or blurred vision. Denies feeling lightheaded or dizzy.

## 2025-05-01 NOTE — PROGRESS NOTES
Subjective:     Postpartum Day 2:  Delivery    The patient feels well. The patient denies emotional concerns. Pain is well controlled with current medications. The baby iswell.  Urinary output is adequate. The patient is ambulating well. The patient is tolerating a normal diet. Flatus has been passed.    Objective:    VITALS:  BP (!) 148/90   Pulse (!) 107   Temp 99.1 °F (37.3 °C) (Oral)   Resp 18   SpO2 97%   Breastfeeding Unknown     Vitals:    25 1022   BP: (!) 148/90   Pulse: (!) 107   Resp: 18   Temp:    SpO2: 97%         General:    alert, appears stated age, and cooperative       Lochia:  appropriate   Uterine Fundus:   firm   Incision:  healing well, no significant drainage, no dehiscence, no significant erythema   DVT Evaluation:  No evidence of DVT seen on physical exam.     CBC   Lab Results   Component Value Date    WBC 15.1 (H) 2025    HGB 9.5 (L) 2025    HCT 29.9 (L) 2025    MCV 81.3 2025     2025        Assessment:     Status post  section. Doing well postoperatively.     Plan:     Continue current care. Will start Labetalol 100 BID for her borderline pressures and will check an h/h now.

## 2025-05-01 NOTE — PLAN OF CARE
Problem: Chronic Conditions and Co-morbidities  Goal: Patient's chronic conditions and co-morbidity symptoms are monitored and maintained or improved  5/1/2025 1936 by Gill Wolfe RN  Outcome: Progressing  5/1/2025 0758 by Yasmin Adan RN  Outcome: Progressing     Problem: Pain  Goal: Verbalizes/displays adequate comfort level or baseline comfort level  5/1/2025 1936 by Gill Wolfe RN  Outcome: Progressing  5/1/2025 0758 by Yasmin Adan RN  Outcome: Progressing  Flowsheets  Taken 5/1/2025 0733 by Yasmin Adan RN  Verbalizes/displays adequate comfort level or baseline comfort level:   Encourage patient to monitor pain and request assistance   Assess pain using appropriate pain scale   Administer analgesics based on type and severity of pain and evaluate response   Implement non-pharmacological measures as appropriate and evaluate response   Consider cultural and social influences on pain and pain management   Notify Licensed Independent Practitioner if interventions unsuccessful or patient reports new pain  Taken 4/30/2025 2035 by Nakita Grant RN  Verbalizes/displays adequate comfort level or baseline comfort level:   Encourage patient to monitor pain and request assistance   Assess pain using appropriate pain scale   Administer analgesics based on type and severity of pain and evaluate response     Problem: Infection - Adult  Goal: Absence of infection at discharge  5/1/2025 1936 by Gill Wolfe RN  Outcome: Progressing  5/1/2025 0758 by Yasmin Adan RN  Outcome: Progressing  Flowsheets (Taken 5/1/2025 0733)  Absence of infection at discharge:   Assess and monitor for signs and symptoms of infection   Monitor lab/diagnostic results   Monitor all insertion sites i.e., indwelling lines, tubes and drains   Monitor endotracheal (as able) and nasal secretions for changes in amount and color   Coden appropriate cooling/warming therapies per order   Administer

## 2025-05-01 NOTE — PROGRESS NOTES
Lab here to draw blood as ordered. Jaiden in house  used to explain to pt about labs being drawn. Pt verbalizes understanding. Pt remains sitting up in the chair at this time.

## 2025-05-01 NOTE — PROGRESS NOTES
Progress Note( Dr. Abraham)  2025  Subjective:   Admit Date: 2025  PCP: No primary care provider on file.    Admitted For : Scheduled fetal nonstress test due to diabetes mellitus uncontrolled   And the baby was delivered through  was a healthy girl    Consulted For: Better control of blood glucose    Interval History: Patient doing very well her blood glucose is well-controlled During intervention    Denies any chest pains,   Denies SOB .   Denies nausea or vomiting.   No new bowel or bladder symptoms.       Intake/Output Summary (Last 24 hours) at 2025 1243  Last data filed at 2025 1329  Gross per 24 hour   Intake 300 ml   Output 600 ml   Net -300 ml       DATA    CBC:   Recent Labs     25  0620 25  1148   WBC 15.1*  --    HGB 9.5* 10.1*     --     CMP:No results for input(s): \"NA\", \"K\", \"CL\", \"CO2\", \"BUN\", \"CREATININE\", \"GLU\", \"CALCIUM\", \"LABALBU\", \"BILITOT\", \"ALKPHOS\", \"AST\", \"ALT\" in the last 72 hours.    Invalid input(s): \"PROT\"  Lipids: No results found for: \"CHOL\", \"HDL\", \"TRIG\"  Glucose:  Recent Labs     25  2049 25  0726 25  1133   POCGLU 98 116* 108*     [id number]:No results found for: \"LABA1C\"  High Sensitivity TSH: No results found for: \"TSHHS\"  Free T3: No results found for: \"FT3\"  Free T4:No results found for: \"T4FREE\"    No orders to display        Scheduled Medicines   Medications:    insulin lispro  0-8 Units SubCUTAneous 4x Daily AC & HS    labetalol  100 mg Oral 2 times per day    ibuprofen  800 mg Oral Q8H    [Held by provider] insulin glargine  10 Units SubCUTAneous Nightly    lactated ringers  1,000 mL IntraVENous Once    sodium chloride flush  10 mL IntraVENous 2 times per day    sodium chloride flush  5-40 mL IntraVENous 2 times per day    acetaminophen  1,000 mg Oral Q8H    docusate sodium  100 mg Oral BID    measles, mumps & rubella vaccine  0.5 mL SubCUTAneous Prior to discharge    Tdap-Dtap  0.5 mL IntraMUSCular Prior

## 2025-05-02 VITALS
HEART RATE: 107 BPM | SYSTOLIC BLOOD PRESSURE: 143 MMHG | DIASTOLIC BLOOD PRESSURE: 82 MMHG | TEMPERATURE: 99 F | OXYGEN SATURATION: 97 % | RESPIRATION RATE: 16 BRPM

## 2025-05-02 LAB
GLUCOSE BLD-MCNC: 133 MG/DL (ref 74–99)
GLUCOSE BLD-MCNC: 70 MG/DL (ref 74–99)
GLUCOSE BLD-MCNC: 81 MG/DL (ref 74–99)

## 2025-05-02 PROCEDURE — APPNB30 APP NON BILLABLE TIME 0-30 MINS

## 2025-05-02 PROCEDURE — 82962 GLUCOSE BLOOD TEST: CPT

## 2025-05-02 PROCEDURE — 6370000000 HC RX 637 (ALT 250 FOR IP): Performed by: OBSTETRICS & GYNECOLOGY

## 2025-05-02 PROCEDURE — 6360000002 HC RX W HCPCS: Performed by: OBSTETRICS & GYNECOLOGY

## 2025-05-02 PROCEDURE — 93005 ELECTROCARDIOGRAM TRACING: CPT

## 2025-05-02 RX ORDER — FERROUS SULFATE 325(65) MG
325 TABLET ORAL EVERY OTHER DAY
Qty: 30 TABLET | Refills: 3 | Status: SHIPPED | OUTPATIENT
Start: 2025-05-03

## 2025-05-02 RX ORDER — IBUPROFEN 800 MG/1
800 TABLET, FILM COATED ORAL EVERY 8 HOURS
Qty: 120 TABLET | Refills: 3 | Status: SHIPPED | OUTPATIENT
Start: 2025-05-02

## 2025-05-02 RX ORDER — METFORMIN HYDROCHLORIDE 500 MG/1
500 TABLET, EXTENDED RELEASE ORAL 2 TIMES DAILY WITH MEALS
Qty: 30 TABLET | Refills: 3 | Status: SHIPPED | OUTPATIENT
Start: 2025-05-02

## 2025-05-02 RX ORDER — LABETALOL 100 MG/1
100 TABLET, FILM COATED ORAL EVERY 12 HOURS SCHEDULED
Qty: 60 TABLET | Refills: 3 | Status: SHIPPED | OUTPATIENT
Start: 2025-05-02

## 2025-05-02 RX ORDER — OXYCODONE HYDROCHLORIDE 5 MG/1
5 TABLET ORAL EVERY 6 HOURS PRN
Qty: 15 TABLET | Refills: 0 | Status: SHIPPED | OUTPATIENT
Start: 2025-05-02 | End: 2025-05-09

## 2025-05-02 RX ORDER — PSEUDOEPHEDRINE HCL 30 MG
100 TABLET ORAL 2 TIMES DAILY
Qty: 60 CAPSULE | Refills: 0 | Status: SHIPPED | OUTPATIENT
Start: 2025-05-02

## 2025-05-02 RX ADMIN — DOCUSATE SODIUM 100 MG: 100 CAPSULE, LIQUID FILLED ORAL at 09:14

## 2025-05-02 RX ADMIN — IBUPROFEN 800 MG: 800 TABLET, FILM COATED ORAL at 09:19

## 2025-05-02 RX ADMIN — LABETALOL HYDROCHLORIDE 100 MG: 100 TABLET, FILM COATED ORAL at 09:14

## 2025-05-02 RX ADMIN — OXYCODONE HYDROCHLORIDE 5 MG: 5 TABLET ORAL at 15:13

## 2025-05-02 RX ADMIN — Medication 1 TABLET: at 09:14

## 2025-05-02 RX ADMIN — ACETAMINOPHEN 1000 MG: 500 TABLET ORAL at 03:23

## 2025-05-02 RX ADMIN — ENOXAPARIN SODIUM 40 MG: 100 INJECTION SUBCUTANEOUS at 09:15

## 2025-05-02 ASSESSMENT — PAIN DESCRIPTION - LOCATION
LOCATION: ABDOMEN

## 2025-05-02 ASSESSMENT — PAIN SCALES - GENERAL
PAINLEVEL_OUTOF10: 2
PAINLEVEL_OUTOF10: 7
PAINLEVEL_OUTOF10: 7

## 2025-05-02 ASSESSMENT — PAIN DESCRIPTION - DESCRIPTORS
DESCRIPTORS: ACHING

## 2025-05-02 ASSESSMENT — PAIN DESCRIPTION - ORIENTATION
ORIENTATION: LOWER
ORIENTATION: LOWER

## 2025-05-02 NOTE — PROGRESS NOTES
Department of Obstetrics and Gynecology  Labor and Delivery   Post Partum Progress Note      SUBJECTIVE:  Doing well with no complaints. Ambulating throughout room without dizziness or other s/s of anemia. Reports bleeding is decreasing and pain is well controlled with medication. Denies pain or discharge at incisional site. Has voided without difficulty. Has not had BM, + flatus. Eating and drinking well. Denies HA/visual changes/epigastric pain. Breastfeeding and bottlefeeding is going well. Reports good social support. Denies emotional concerns at this time.     OBJECTIVE:      Vitals:  /84   Pulse (!) 108   Temp 98.4 °F (36.9 °C) (Oral)   Resp 18   SpO2 97%   Breastfeeding Unknown   Lab Results   Component Value Date    WBC 15.1 (H) 04/30/2025    HGB 10.1 (L) 05/01/2025    HCT 31.5 (L) 05/01/2025    MCV 81.3 04/30/2025     04/30/2025       CONSTITUTIONAL: generally well-appearing.   ABDOMEN:  Soft, well contracted uterus. Fundus firm at u-1. C/s incision c/d/i. No erythema or discharge noted. BS present x 4 quadrants.   LOCHIA: Normal per pt  LUNGS: CTAB  HEART: RRR,   EXTREMITIES: No calf tenderness, erythema or swelling bilaterally       ASSESSMENT:      PPD # 3  S/p C/s  Breastfeeding and bottlefeeding well  GBS Positive  RH B+  Type 2 DM- Dr Abraham co-managing   GHTN- started on labetalol  Pt is c/o SOB at rest and when up moving. VSS. Will order EKG and consult respiratory.     PLAN:     Will plan for discharge today pending   Discharge teaching completed including counseling on warning signs (heavy vaginal bleeding, s/s of preeclampsia, fever >100.4, ACHES, s/s of PPD, s/s of infection at incision site).  Rx for colace, ibuprofen and oxycodone.   Pt to schedule f/u incision check at 1 week and pp visit at 6-8 weeks in the office.     Time Spent 15      SUE Packer - RODRIGO

## 2025-05-02 NOTE — PROGRESS NOTES
Progress Note( Dr. Abraham)  2025  Subjective:   Admit Date: 2025  PCP: No primary care provider on file.    Admitted For : Scheduled fetal nonstress test due to diabetes mellitus uncontrolled   And the baby was delivered through  was a healthy girl    Consulted For: Better control of blood glucose    Interval History: Patient doing very well her blood glucose is well-controlled During intervention    Denies any chest pains,   Denies SOB .   Denies nausea or vomiting.   No new bowel or bladder symptoms.     No intake or output data in the 24 hours ending 25 1850      DATA    CBC:   Recent Labs     25  0620 25  1148   WBC 15.1*  --    HGB 9.5* 10.1*     --     CMP:No results for input(s): \"NA\", \"K\", \"CL\", \"CO2\", \"BUN\", \"CREATININE\", \"GLU\", \"CALCIUM\", \"LABALBU\", \"BILITOT\", \"ALKPHOS\", \"AST\", \"ALT\" in the last 72 hours.    Invalid input(s): \"PROT\"  Lipids: No results found for: \"CHOL\", \"HDL\", \"TRIG\"  Glucose:  Recent Labs     25  2155 25  0900 25  1223   POCGLU 133* 70* 81     [id number]:No results found for: \"LABA1C\"  High Sensitivity TSH: No results found for: \"TSHHS\"  Free T3: No results found for: \"FT3\"  Free T4:No results found for: \"T4FREE\"    No orders to display        Scheduled Medicines   Medications:        Infusions:           Objective:   Vitals: BP (!) 143/82   Pulse (!) 107   Temp 99 °F (37.2 °C) (Oral)   Resp 16   SpO2 97%   Breastfeeding Unknown   General appearance: alert and cooperative with exam  Neck: no JVD or bruit  Thyroid : Normal lobes   Lungs: Has Vesicular Breath sounds   Heart:  regular rate and rhythm  Abdomen: soft, non-tender; bowel sounds normal; no masses,  no organomegaly  Musculoskeletal: Normal  Extremities: extremities normal, , no edema  Neurologic:  Awake, alert, oriented to name, place and time.  Cranial nerves II-XII are grossly intact.  Motor is  intact.  Sensory is intact.,  and gait is  normal.    Assessment:     Patient Active Problem List:     Diabetes type 2 (HCC)     Labor and delivery indication for care or intervention      Plan:     Reviewed POC blood glucose . Labs and X ray results   Reviewed Current Medicines   On Correction bolus Humalog/ Basal Lantus Insulin ( ON Hold)  regime / and Oral Hypoglycemic drugs with some restrictions  Monitor Blood glucose frequently   Modified  the dose of Insulin/ other medicines as needed   Will follow     .     LELA Abraham MD,

## 2025-05-02 NOTE — FLOWSHEET NOTE
RN completed discharge teaching with mother. Mother verbalized understanding. All questions answered. Pt. Knows to make follow up appointment with OB doctor. Pt to  prescriptions.  Patient informed metformin was recently filled and will take one pill twice daily now.

## 2025-05-02 NOTE — DISCHARGE SUMMARY
Obstetrical Discharge Form    Gestational Age:  38w1d    Antepartum complications: Pre-existing diabetes managed by [facility]     Date of Delivery:   2025      Type of Delivery:    for failure to progress    Delivered By:    Dr beth             Baby:       Information for the patient's :  Marty Vaz [[id number]]      Anesthesia:    Epidural    Intrapartum complications: None    Feeding method:   bottle -     Postpartum complications: none    Discharge Date:   2025    Condition of discharge:  good    /84   Pulse (!) 108   Temp 98.4 °F (36.9 °C) (Oral)   Resp 18   SpO2 97%   Breastfeeding Unknown        Plan:     Discharge teaching completed including counseling on warning signs (heavy vaginal bleeding, s/s of preeclampsia, fever >100.4, ACHES, s/s of PPD).    Follow up    in 1 week(s)

## 2025-05-02 NOTE — PROGRESS NOTES
Outpatient Pharmacy Progress Note for Meds-to-Beds    Total number of Prescriptions Filled: 0    Additional Documentation:  OP received 1 rx for metformin er 500. Patient got 360 tablets from [facility] in march. New rx is dose change however insurance wont pay for more since she has some at home. Called rn and told her about dose change. Pt will have 5 meds at [facility].       Thank you for letting us serve your patients.  [facility] [place] [address]    Phone: [phone]    Fax: [phone]

## 2025-05-03 LAB
EKG ATRIAL RATE: 102 BPM
EKG DIAGNOSIS: NORMAL
EKG P AXIS: 48 DEGREES
EKG P-R INTERVAL: 122 MS
EKG Q-T INTERVAL: 344 MS
EKG QRS DURATION: 86 MS
EKG QTC CALCULATION (BAZETT): 448 MS
EKG R AXIS: 63 DEGREES
EKG T AXIS: 38 DEGREES
EKG VENTRICULAR RATE: 102 BPM

## 2025-05-03 PROCEDURE — 93010 ELECTROCARDIOGRAM REPORT: CPT | Performed by: INTERNAL MEDICINE

## 2025-05-05 ENCOUNTER — HOSPITAL ENCOUNTER (EMERGENCY)
Dept: NON INVASIVE DIAGNOSTICS | Age: 30
Discharge: HOME | DRG: 561 | End: 2025-05-07
Attending: INTERNAL MEDICINE
Payer: MEDICAID

## 2025-05-05 ENCOUNTER — APPOINTMENT (OUTPATIENT)
Dept: CT IMAGING | Age: 30
DRG: 561 | End: 2025-05-05
Payer: MEDICAID

## 2025-05-05 ENCOUNTER — APPOINTMENT (OUTPATIENT)
Dept: GENERAL RADIOLOGY | Age: 30
DRG: 561 | End: 2025-05-05
Payer: MEDICAID

## 2025-05-05 ENCOUNTER — HOSPITAL ENCOUNTER (INPATIENT)
Age: 30
LOS: 2 days | Discharge: HOME | DRG: 561 | End: 2025-05-07
Attending: STUDENT IN AN ORGANIZED HEALTH CARE EDUCATION/TRAINING PROGRAM | Admitting: STUDENT IN AN ORGANIZED HEALTH CARE EDUCATION/TRAINING PROGRAM
Payer: MEDICAID

## 2025-05-05 VITALS
HEIGHT: 62 IN | BODY MASS INDEX: 24.84 KG/M2 | WEIGHT: 135 LBS | HEART RATE: 106 BPM | DIASTOLIC BLOOD PRESSURE: 96 MMHG | SYSTOLIC BLOOD PRESSURE: 154 MMHG

## 2025-05-05 DIAGNOSIS — R06.02 SHORTNESS OF BREATH: ICD-10-CM

## 2025-05-05 DIAGNOSIS — O14.90 PRE-ECLAMPSIA, ANTEPARTUM: ICD-10-CM

## 2025-05-05 LAB
ALBUMIN SERPL-MCNC: 3.5 G/DL (ref 3.4–5)
ALBUMIN/GLOB SERPL: 1.1 {RATIO} (ref 1.1–2.2)
ALP SERPL-CCNC: 151 U/L (ref 40–129)
ALT SERPL-CCNC: 113 U/L (ref 10–40)
ANION GAP SERPL CALCULATED.3IONS-SCNC: 16 MMOL/L (ref 9–17)
AST SERPL-CCNC: 82 U/L (ref 15–37)
BASOPHILS # BLD: 0.03 K/UL
BASOPHILS NFR BLD: 0 % (ref 0–1)
BILIRUB SERPL-MCNC: 0.5 MG/DL (ref 0–1)
BILIRUB UR QL STRIP: NEGATIVE
BNP SERPL-MCNC: 619 PG/ML (ref 0–125)
BUN SERPL-MCNC: 13 MG/DL (ref 7–20)
CALCIUM SERPL-MCNC: 9.6 MG/DL (ref 8.3–10.6)
CHARACTER UR: ABNORMAL
CHLORIDE SERPL-SCNC: 107 MMOL/L (ref 99–110)
CLARITY UR: CLEAR
CO2 SERPL-SCNC: 18 MMOL/L (ref 21–32)
COLOR UR: YELLOW
CREAT SERPL-MCNC: 0.6 MG/DL (ref 0.6–1.1)
ECHO AO ROOT DIAM: 2.4 CM
ECHO AO ROOT INDEX: 1.48 CM/M2
ECHO AV AREA PEAK VELOCITY: 2 CM2
ECHO AV AREA VTI: 2.1 CM2
ECHO AV AREA/BSA PEAK VELOCITY: 1.2 CM2/M2
ECHO AV AREA/BSA VTI: 1.3 CM2/M2
ECHO AV CUSP MM: 1.6 CM
ECHO AV MEAN GRADIENT: 8 MMHG
ECHO AV MEAN VELOCITY: 1.3 M/S
ECHO AV PEAK GRADIENT: 15 MMHG
ECHO AV PEAK VELOCITY: 2 M/S
ECHO AV VELOCITY RATIO: 0.8
ECHO AV VTI: 27.4 CM
ECHO BSA: 1.64 M2
ECHO EST RA PRESSURE: 3 MMHG
ECHO IVC PROX: 1.1 CM
ECHO LA AREA 4C: 19.7 CM2
ECHO LA DIAMETER INDEX: 2.04 CM/M2
ECHO LA DIAMETER: 3.3 CM
ECHO LA MAJOR AXIS: 5.3 CM
ECHO LA TO AORTIC ROOT RATIO: 1.38
ECHO LA VOL MOD A4C: 59 ML (ref 22–52)
ECHO LA VOLUME INDEX MOD A4C: 36 ML/M2 (ref 16–34)
ECHO LV EDV A4C: 105 ML
ECHO LV EDV INDEX A4C: 65 ML/M2
ECHO LV EF PHYSICIAN: 60 %
ECHO LV EJECTION FRACTION A4C: 64 %
ECHO LV ESV A4C: 38 ML
ECHO LV ESV INDEX A4C: 23 ML/M2
ECHO LV FRACTIONAL SHORTENING: 17 % (ref 28–44)
ECHO LV INTERNAL DIMENSION DIASTOLE INDEX: 3.21 CM/M2
ECHO LV INTERNAL DIMENSION DIASTOLIC: 5.2 CM (ref 3.9–5.3)
ECHO LV INTERNAL DIMENSION SYSTOLIC INDEX: 2.65 CM/M2
ECHO LV INTERNAL DIMENSION SYSTOLIC: 4.3 CM
ECHO LV IVSD: 0.7 CM (ref 0.6–0.9)
ECHO LV MASS 2D: 145.2 G (ref 67–162)
ECHO LV MASS INDEX 2D: 89.6 G/M2 (ref 43–95)
ECHO LV POSTERIOR WALL DIASTOLIC: 0.9 CM (ref 0.6–0.9)
ECHO LV RELATIVE WALL THICKNESS RATIO: 0.35
ECHO LVOT AREA: 2.5 CM2
ECHO LVOT AV VTI INDEX: 0.84
ECHO LVOT DIAM: 1.8 CM
ECHO LVOT MEAN GRADIENT: 5 MMHG
ECHO LVOT PEAK GRADIENT: 10 MMHG
ECHO LVOT PEAK VELOCITY: 1.6 M/S
ECHO LVOT STROKE VOLUME INDEX: 36 ML/M2
ECHO LVOT SV: 58.2 ML
ECHO LVOT VTI: 22.9 CM
ECHO RA AREA 4C: 10.1 CM2
ECHO RA END SYSTOLIC VOLUME APICAL 4 CHAMBER INDEX BSA: 13 ML/M2
ECHO RA VOLUME: 21 ML
ECHO RV FREE WALL PEAK S': 15 CM/S
ECHO RV MID DIMENSION: 2.8 CM
ECHO RV TAPSE: 2 CM (ref 1.7–?)
EKG ATRIAL RATE: 100 BPM
EKG DIAGNOSIS: NORMAL
EKG P AXIS: 61 DEGREES
EKG P-R INTERVAL: 120 MS
EKG Q-T INTERVAL: 342 MS
EKG QRS DURATION: 74 MS
EKG QTC CALCULATION (BAZETT): 441 MS
EKG R AXIS: 61 DEGREES
EKG T AXIS: 46 DEGREES
EKG VENTRICULAR RATE: 100 BPM
EOSINOPHIL # BLD: 0.05 K/UL
EOSINOPHILS RELATIVE PERCENT: 1 % (ref 0–3)
EPI CELLS #/AREA URNS HPF: 1 /HPF
ERYTHROCYTE [DISTWIDTH] IN BLOOD BY AUTOMATED COUNT: 16.1 % (ref 11.7–14.9)
FERRITIN SERPL-MCNC: 74 NG/ML (ref 15–150)
GFR, ESTIMATED: >90 ML/MIN/1.73M2
GLUCOSE BLD-MCNC: 118 MG/DL (ref 74–99)
GLUCOSE BLD-MCNC: 169 MG/DL (ref 74–99)
GLUCOSE SERPL-MCNC: 75 MG/DL (ref 74–99)
GLUCOSE UR STRIP-MCNC: NEGATIVE MG/DL
HCT VFR BLD AUTO: 32.9 % (ref 37–47)
HGB BLD-MCNC: 10.5 G/DL (ref 12.5–16)
HGB UR QL STRIP.AUTO: ABNORMAL
IMM GRANULOCYTES # BLD AUTO: 0.1 K/UL
IMM GRANULOCYTES NFR BLD: 1 %
IRON SATN MFR SERPL: 7 % (ref 15–50)
IRON SERPL-MCNC: 30 UG/DL (ref 37–145)
KETONES UR STRIP-MCNC: NEGATIVE MG/DL
LEUKOCYTE ESTERASE UR QL STRIP: NEGATIVE
LYMPHOCYTES NFR BLD: 1.49 K/UL
LYMPHOCYTES RELATIVE PERCENT: 17 % (ref 24–44)
MCH RBC QN AUTO: 26.3 PG (ref 27–31)
MCHC RBC AUTO-ENTMCNC: 31.9 G/DL (ref 32–36)
MCV RBC AUTO: 82.5 FL (ref 78–100)
MONOCYTES NFR BLD: 0.45 K/UL
MONOCYTES NFR BLD: 5 % (ref 0–5)
NEUTROPHILS NFR BLD: 76 % (ref 36–66)
NEUTS SEG NFR BLD: 6.65 K/UL
NITRITE UR QL STRIP: NEGATIVE
PH UR STRIP: 6 [PH] (ref 5–8)
PLATELET # BLD AUTO: 418 K/UL (ref 140–440)
PMV BLD AUTO: 10.9 FL (ref 7.5–11.1)
POTASSIUM SERPL-SCNC: 4 MMOL/L (ref 3.5–5.1)
PROT SERPL-MCNC: 6.8 G/DL (ref 6.4–8.2)
PROT UR STRIP-MCNC: NEGATIVE MG/DL
RBC # BLD AUTO: 3.99 M/UL (ref 4.2–5.4)
RBC #/AREA URNS HPF: 1 /HPF (ref 0–2)
SODIUM SERPL-SCNC: 141 MMOL/L (ref 136–145)
SP GR UR STRIP: 1.01 (ref 1–1.03)
TIBC SERPL-MCNC: ABNORMAL UG/DL (ref 260–445)
TROPONIN I SERPL HS-MCNC: 24 NG/L (ref 0–14)
TROPONIN I SERPL HS-MCNC: 27 NG/L (ref 0–14)
TROPONIN I SERPL HS-MCNC: 28 NG/L (ref 0–14)
TSH SERPL DL<=0.05 MIU/L-ACNC: 1.91 UIU/ML (ref 0.27–4.2)
UNSATURATED IRON BINDING CAPACITY: 374 UG/DL (ref 110–370)
UROBILINOGEN UR STRIP-ACNC: 0.2 EU/DL (ref 0–1)
WBC #/AREA URNS HPF: 2 /HPF (ref 0–5)
WBC OTHER # BLD: 8.8 K/UL (ref 4–10.5)

## 2025-05-05 PROCEDURE — 2500000003 HC RX 250 WO HCPCS: Performed by: OBSTETRICS & GYNECOLOGY

## 2025-05-05 PROCEDURE — 80053 COMPREHEN METABOLIC PANEL: CPT

## 2025-05-05 PROCEDURE — 6360000002 HC RX W HCPCS: Performed by: OBSTETRICS & GYNECOLOGY

## 2025-05-05 PROCEDURE — 82962 GLUCOSE BLOOD TEST: CPT

## 2025-05-05 PROCEDURE — 2060000000 HC ICU INTERMEDIATE R&B

## 2025-05-05 PROCEDURE — 84484 ASSAY OF TROPONIN QUANT: CPT

## 2025-05-05 PROCEDURE — 6360000004 HC RX CONTRAST MEDICATION: Performed by: PHYSICIAN ASSISTANT

## 2025-05-05 PROCEDURE — 6360000002 HC RX W HCPCS: Performed by: INTERNAL MEDICINE

## 2025-05-05 PROCEDURE — 6370000000 HC RX 637 (ALT 250 FOR IP): Performed by: PHYSICIAN ASSISTANT

## 2025-05-05 PROCEDURE — 93306 TTE W/DOPPLER COMPLETE: CPT | Performed by: INTERNAL MEDICINE

## 2025-05-05 PROCEDURE — 6360000002 HC RX W HCPCS: Performed by: PHYSICIAN ASSISTANT

## 2025-05-05 PROCEDURE — 83880 ASSAY OF NATRIURETIC PEPTIDE: CPT

## 2025-05-05 PROCEDURE — 82570 ASSAY OF URINE CREATININE: CPT

## 2025-05-05 PROCEDURE — 2000000000 HC ICU R&B

## 2025-05-05 PROCEDURE — 83540 ASSAY OF IRON: CPT

## 2025-05-05 PROCEDURE — 36415 COLL VENOUS BLD VENIPUNCTURE: CPT

## 2025-05-05 PROCEDURE — 2580000003 HC RX 258: Performed by: STUDENT IN AN ORGANIZED HEALTH CARE EDUCATION/TRAINING PROGRAM

## 2025-05-05 PROCEDURE — 93010 ELECTROCARDIOGRAM REPORT: CPT | Performed by: INTERNAL MEDICINE

## 2025-05-05 PROCEDURE — 85025 COMPLETE CBC W/AUTO DIFF WBC: CPT

## 2025-05-05 PROCEDURE — 81001 URINALYSIS AUTO W/SCOPE: CPT

## 2025-05-05 PROCEDURE — 93306 TTE W/DOPPLER COMPLETE: CPT

## 2025-05-05 PROCEDURE — 94761 N-INVAS EAR/PLS OXIMETRY MLT: CPT

## 2025-05-05 PROCEDURE — 71045 X-RAY EXAM CHEST 1 VIEW: CPT

## 2025-05-05 PROCEDURE — 2500000003 HC RX 250 WO HCPCS: Performed by: STUDENT IN AN ORGANIZED HEALTH CARE EDUCATION/TRAINING PROGRAM

## 2025-05-05 PROCEDURE — 96374 THER/PROPH/DIAG INJ IV PUSH: CPT

## 2025-05-05 PROCEDURE — 99223 1ST HOSP IP/OBS HIGH 75: CPT | Performed by: INTERNAL MEDICINE

## 2025-05-05 PROCEDURE — 71260 CT THORAX DX C+: CPT

## 2025-05-05 PROCEDURE — 93005 ELECTROCARDIOGRAM TRACING: CPT | Performed by: STUDENT IN AN ORGANIZED HEALTH CARE EDUCATION/TRAINING PROGRAM

## 2025-05-05 PROCEDURE — 83550 IRON BINDING TEST: CPT

## 2025-05-05 PROCEDURE — 82728 ASSAY OF FERRITIN: CPT

## 2025-05-05 PROCEDURE — 99285 EMERGENCY DEPT VISIT HI MDM: CPT

## 2025-05-05 PROCEDURE — 84443 ASSAY THYROID STIM HORMONE: CPT

## 2025-05-05 PROCEDURE — 6370000000 HC RX 637 (ALT 250 FOR IP): Performed by: STUDENT IN AN ORGANIZED HEALTH CARE EDUCATION/TRAINING PROGRAM

## 2025-05-05 PROCEDURE — 82043 UR ALBUMIN QUANTITATIVE: CPT

## 2025-05-05 RX ORDER — ONDANSETRON 4 MG/1
4 TABLET, ORALLY DISINTEGRATING ORAL EVERY 6 HOURS PRN
Status: DISCONTINUED | OUTPATIENT
Start: 2025-05-05 | End: 2025-05-05

## 2025-05-05 RX ORDER — LABETALOL HYDROCHLORIDE 5 MG/ML
80 INJECTION, SOLUTION INTRAVENOUS
Status: DISCONTINUED | OUTPATIENT
Start: 2025-05-05 | End: 2025-05-07 | Stop reason: HOSPADM

## 2025-05-05 RX ORDER — MAGNESIUM SULFATE 4 G/50ML
4000 INJECTION INTRAVENOUS ONCE
Status: DISCONTINUED | OUTPATIENT
Start: 2025-05-05 | End: 2025-05-05

## 2025-05-05 RX ORDER — SODIUM CHLORIDE 0.9 % (FLUSH) 0.9 %
5-40 SYRINGE (ML) INJECTION PRN
Status: DISCONTINUED | OUTPATIENT
Start: 2025-05-05 | End: 2025-05-07 | Stop reason: HOSPADM

## 2025-05-05 RX ORDER — SODIUM CHLORIDE 0.9 % (FLUSH) 0.9 %
5-40 SYRINGE (ML) INJECTION EVERY 12 HOURS SCHEDULED
Status: DISCONTINUED | OUTPATIENT
Start: 2025-05-05 | End: 2025-05-07 | Stop reason: HOSPADM

## 2025-05-05 RX ORDER — HYDRALAZINE HYDROCHLORIDE 20 MG/ML
10 INJECTION INTRAMUSCULAR; INTRAVENOUS
Status: DISCONTINUED | OUTPATIENT
Start: 2025-05-05 | End: 2025-05-07 | Stop reason: HOSPADM

## 2025-05-05 RX ORDER — POTASSIUM CHLORIDE 1500 MG/1
40 TABLET, EXTENDED RELEASE ORAL PRN
Status: DISCONTINUED | OUTPATIENT
Start: 2025-05-05 | End: 2025-05-07 | Stop reason: HOSPADM

## 2025-05-05 RX ORDER — ONDANSETRON 2 MG/ML
4 INJECTION INTRAMUSCULAR; INTRAVENOUS EVERY 6 HOURS PRN
Status: DISCONTINUED | OUTPATIENT
Start: 2025-05-05 | End: 2025-05-05

## 2025-05-05 RX ORDER — MAGNESIUM SULFATE IN WATER 40 MG/ML
2000 INJECTION, SOLUTION INTRAVENOUS PRN
Status: DISCONTINUED | OUTPATIENT
Start: 2025-05-05 | End: 2025-05-06

## 2025-05-05 RX ORDER — LABETALOL 100 MG/1
100 TABLET, FILM COATED ORAL EVERY 12 HOURS SCHEDULED
Status: DISCONTINUED | OUTPATIENT
Start: 2025-05-05 | End: 2025-05-07

## 2025-05-05 RX ORDER — SODIUM CHLORIDE, SODIUM LACTATE, POTASSIUM CHLORIDE, CALCIUM CHLORIDE 600; 310; 30; 20 MG/100ML; MG/100ML; MG/100ML; MG/100ML
INJECTION, SOLUTION INTRAVENOUS CONTINUOUS
Status: DISCONTINUED | OUTPATIENT
Start: 2025-05-05 | End: 2025-05-06

## 2025-05-05 RX ORDER — ONDANSETRON 4 MG/1
4 TABLET, ORALLY DISINTEGRATING ORAL EVERY 8 HOURS PRN
Status: DISCONTINUED | OUTPATIENT
Start: 2025-05-05 | End: 2025-05-07 | Stop reason: HOSPADM

## 2025-05-05 RX ORDER — SODIUM CHLORIDE 9 MG/ML
INJECTION, SOLUTION INTRAVENOUS PRN
Status: DISCONTINUED | OUTPATIENT
Start: 2025-05-05 | End: 2025-05-07 | Stop reason: HOSPADM

## 2025-05-05 RX ORDER — CALCIUM GLUCONATE 94 MG/ML
1000 INJECTION, SOLUTION INTRAVENOUS PRN
Status: DISCONTINUED | OUTPATIENT
Start: 2025-05-05 | End: 2025-05-07 | Stop reason: HOSPADM

## 2025-05-05 RX ORDER — LABETALOL HYDROCHLORIDE 5 MG/ML
20 INJECTION, SOLUTION INTRAVENOUS
Status: DISCONTINUED | OUTPATIENT
Start: 2025-05-05 | End: 2025-05-07 | Stop reason: HOSPADM

## 2025-05-05 RX ORDER — POTASSIUM CHLORIDE 7.45 MG/ML
10 INJECTION INTRAVENOUS PRN
Status: DISCONTINUED | OUTPATIENT
Start: 2025-05-05 | End: 2025-05-07 | Stop reason: HOSPADM

## 2025-05-05 RX ORDER — DEXTROSE MONOHYDRATE 100 MG/ML
INJECTION, SOLUTION INTRAVENOUS CONTINUOUS PRN
Status: DISCONTINUED | OUTPATIENT
Start: 2025-05-05 | End: 2025-05-07 | Stop reason: HOSPADM

## 2025-05-05 RX ORDER — MAGNESIUM SULFATE 4 G/50ML
4000 INJECTION INTRAVENOUS ONCE
Status: COMPLETED | OUTPATIENT
Start: 2025-05-05 | End: 2025-05-05

## 2025-05-05 RX ORDER — POLYETHYLENE GLYCOL 3350 17 G/17G
17 POWDER, FOR SOLUTION ORAL DAILY PRN
Status: DISCONTINUED | OUTPATIENT
Start: 2025-05-05 | End: 2025-05-07 | Stop reason: HOSPADM

## 2025-05-05 RX ORDER — GLUCAGON 1 MG/ML
1 KIT INJECTION PRN
Status: DISCONTINUED | OUTPATIENT
Start: 2025-05-05 | End: 2025-05-07 | Stop reason: HOSPADM

## 2025-05-05 RX ORDER — ACETAMINOPHEN 650 MG/1
650 SUPPOSITORY RECTAL EVERY 6 HOURS PRN
Status: DISCONTINUED | OUTPATIENT
Start: 2025-05-05 | End: 2025-05-07 | Stop reason: HOSPADM

## 2025-05-05 RX ORDER — SODIUM CHLORIDE 9 MG/ML
INJECTION, SOLUTION INTRAVENOUS PRN
Status: DISCONTINUED | OUTPATIENT
Start: 2025-05-05 | End: 2025-05-05

## 2025-05-05 RX ORDER — ENOXAPARIN SODIUM 100 MG/ML
40 INJECTION SUBCUTANEOUS DAILY
Status: DISCONTINUED | OUTPATIENT
Start: 2025-05-06 | End: 2025-05-07 | Stop reason: HOSPADM

## 2025-05-05 RX ORDER — FUROSEMIDE 10 MG/ML
20 INJECTION INTRAMUSCULAR; INTRAVENOUS ONCE
Status: COMPLETED | OUTPATIENT
Start: 2025-05-05 | End: 2025-05-05

## 2025-05-05 RX ORDER — ONDANSETRON 2 MG/ML
4 INJECTION INTRAMUSCULAR; INTRAVENOUS EVERY 6 HOURS PRN
Status: DISCONTINUED | OUTPATIENT
Start: 2025-05-05 | End: 2025-05-07 | Stop reason: HOSPADM

## 2025-05-05 RX ORDER — INSULIN LISPRO 100 [IU]/ML
0-8 INJECTION, SOLUTION INTRAVENOUS; SUBCUTANEOUS
Status: DISCONTINUED | OUTPATIENT
Start: 2025-05-05 | End: 2025-05-07 | Stop reason: HOSPADM

## 2025-05-05 RX ORDER — FUROSEMIDE 10 MG/ML
60 INJECTION INTRAMUSCULAR; INTRAVENOUS 2 TIMES DAILY
Status: COMPLETED | OUTPATIENT
Start: 2025-05-05 | End: 2025-05-07

## 2025-05-05 RX ORDER — NIFEDIPINE 10 MG/1
10 CAPSULE ORAL ONCE
Status: COMPLETED | OUTPATIENT
Start: 2025-05-05 | End: 2025-05-05

## 2025-05-05 RX ORDER — LABETALOL HYDROCHLORIDE 5 MG/ML
40 INJECTION, SOLUTION INTRAVENOUS
Status: DISCONTINUED | OUTPATIENT
Start: 2025-05-05 | End: 2025-05-07 | Stop reason: HOSPADM

## 2025-05-05 RX ORDER — IOPAMIDOL 755 MG/ML
75 INJECTION, SOLUTION INTRAVASCULAR
Status: COMPLETED | OUTPATIENT
Start: 2025-05-05 | End: 2025-05-05

## 2025-05-05 RX ORDER — ACETAMINOPHEN 325 MG/1
650 TABLET ORAL EVERY 6 HOURS PRN
Status: DISCONTINUED | OUTPATIENT
Start: 2025-05-05 | End: 2025-05-07 | Stop reason: HOSPADM

## 2025-05-05 RX ADMIN — NIFEDIPINE 10 MG: 10 CAPSULE ORAL at 10:24

## 2025-05-05 RX ADMIN — FUROSEMIDE 20 MG: 10 INJECTION, SOLUTION INTRAMUSCULAR; INTRAVENOUS at 11:41

## 2025-05-05 RX ADMIN — MAGNESIUM SULFATE HEPTAHYDRATE 2000 MG/HR: 40 INJECTION, SOLUTION INTRAVENOUS at 19:00

## 2025-05-05 RX ADMIN — SODIUM CHLORIDE, PRESERVATIVE FREE 10 ML: 5 INJECTION INTRAVENOUS at 21:30

## 2025-05-05 RX ADMIN — SODIUM CHLORIDE, SODIUM LACTATE, POTASSIUM CHLORIDE, AND CALCIUM CHLORIDE: .6; .31; .03; .02 INJECTION, SOLUTION INTRAVENOUS at 18:15

## 2025-05-05 RX ADMIN — IOPAMIDOL 75 ML: 755 INJECTION, SOLUTION INTRAVENOUS at 10:49

## 2025-05-05 RX ADMIN — FUROSEMIDE 60 MG: 10 INJECTION, SOLUTION INTRAMUSCULAR; INTRAVENOUS at 18:40

## 2025-05-05 RX ADMIN — LABETALOL HYDROCHLORIDE 100 MG: 100 TABLET, FILM COATED ORAL at 21:31

## 2025-05-05 RX ADMIN — MAGNESIUM SULFATE HEPTAHYDRATE 4000 MG: 80 INJECTION, SOLUTION INTRAVENOUS at 18:34

## 2025-05-05 ASSESSMENT — PAIN - FUNCTIONAL ASSESSMENT: PAIN_FUNCTIONAL_ASSESSMENT: NONE - DENIES PAIN

## 2025-05-05 NOTE — H&P
V2.0  History and Physical      Name:  Hannah Vaz /Age/Sex: 1995  (29 y.o. female)   MRN & CSN:  [id number] & [id number] Encounter Date/Time: 2025 12:26 PM EDT   Location:  [address] [place] [address]-A PCP: No primary care provider on file.       Hospital Day: 1      History of Present Illness:     Chief Complaint: MAGUI Vaz is a 29 y.o. female with PMH of DM II, gestational HTN , recent  delivery who presents with shortness of breath and swelling in her legs up to her belly which started after the C section. No cough. She also reports chest pain on exertion and also worse on deep breath.     At ED, pt was afebrile, hemodynamically stable 's; on room air. Labs unremarkable; HCO3 18, troponin 28-> 24, , Hgb 10.5.  CTPA no PE with findings suggestive of CHF.  ED consulted cardiology.  Patient received 1 dose of Lasix 20 mg IV. Discussed with ED, will admit pt to med surg  for CHF  workup and management.     Assessment and Plan:     Pre-eclampsia  P/w shortness of breath and bilateral LE edema  CTPA suggestive of volume overload    ECHO EF 55-60%, unremarkable overall  - Cards consulted  - BP control  - on mg infusion per ob-gyn for seizure prophylaxis  - on LR low rate 75 per Ob-gyn as pt is on magnesium infusion; confirmed plan with ob-gyn who was ok with decreasing rate to 75  - Strict I's/O.  Standing weight  - check UA, urine alb/cr ratio  - resume home labetalol  - now on IV BP PRN meds per ob-gyn    Type II diabetes. On home insulin but not taking . Last HA1C 7.9 2025  - hypoglycemia protocol  - MDSSI     Recent  delivery  Ob-gyn consulted          Code status: discussed code status  with patient/POA ; explained all code status options, patient/POA chose to be FULL CODE.     Medical Decision Making:  Personally reviewed lab studies and imaging.    Imaging that was interpreted personally and results as above.  Drugs that require monitoring for toxicity include

## 2025-05-05 NOTE — ED PROVIDER NOTES
[facility] EMERGENCY DEPARTMENT  EMERGENCY DEPARTMENT ENCOUNTER        Pt Name: Hannah Vaz  MRN: [id number]  Birthdate 1995  Date of evaluation: 2025  Provider: Billy Dao PA-C  PCP: No primary care provider on file.  Note Started: 9:47 AM EDT 25      RANI. I have evaluated this patient.        CHIEF COMPLAINT       Chief Complaint   Patient presents with    Shortness of Breath     Pt c/o shortness of breath, fatigue, and BLE swelling- onset after she delivered her first baby by  here on 2025. Her BP is also currently elevated at 154/106. She states her BP was not elevated during her pregnancy. She states she is currently pain free.       HISTORY OF PRESENT ILLNESS: 1 or more Elements     History From: pt    Limitations to history : Language Monegasque Creole, language line  used    Social Determinants Significantly Affecting Health : Patient has significant healthcare illiteracy. Additional time provided in explanations.    Chief Complaint: Lower extremity edema, shortness of breath    Hannah Vaz is a 29 y.o. female who is status post  on 2025 who presents complaining of lower extremity edema, shortness of breath.  Patient reports she was admitted for high blood pressure, states she had a  on .  She had some lower extremity edema that she states was improving for a few days but over the last 3 days it has been worsening and now she feels short of breath with walking.  Blood pressure elevated in triage 157/106, denies history of hypertension.  She does admit to headache, denies vision change.    Medical record reviewed, patient discharged on  after having  by Dr. Flores on     Nursing Notes were all reviewed and agreed with or any disagreements were addressed in the HPI.    REVIEW OF SYSTEMS :      Review of Systems   All other systems reviewed and are negative.      Positives and Pertinent

## 2025-05-05 NOTE — CONSULTS
Department of Obstetrics and Gynecology   Obstetrics History and Physical        CHIEF COMPLAINT:   Chief Complaint   Patient presents with    Shortness of Breath     Pt c/o shortness of breath, fatigue, and BLE swelling- onset after she delivered her first baby by  here on 2025. Her BP is also currently elevated at 154/106. She states her BP was not elevated during her pregnancy. She states she is currently pain free.         HISTORY OF PRESENT ILLNESS:      The patient is a 29 y.o. female at 38w1d.  OB History          1    Para   1    Term   1            AB        Living   1         SAB        IAB        Ectopic        Molar        Multiple   0    Live Births   1            Patient presents with a chief complaint as above and is being admitted for post-partum cardiomyopathy    Estimated Due Date: Estimated Date of Delivery: 25    PRENATAL CARE:    Complicated by: Pre-existing DM    PAST OB HISTORY  OB History          1    Para   1    Term   1            AB        Living   1         SAB        IAB        Ectopic        Molar        Multiple   0    Live Births   1                Past Medical History:        Diagnosis Date    Diabetes type 2 (HCC)      Past Surgical History:        Procedure Laterality Date     SECTION N/A 2025     SECTION performed by Mikel Flores MD at [facility] L[facility] OR     Allergies:  Patient has no known allergies.  Social History:    Social History     Socioeconomic History    Marital status: Unknown     Spouse name: Not on file    Number of children: Not on file    Years of education: Not on file    Highest education level: Not on file   Occupational History    Not on file   Tobacco Use    Smoking status: Never     Passive exposure: Never    Smokeless tobacco: Never   Vaping Use    Vaping status: Never Used   Substance and Sexual Activity    Alcohol use: Never    Drug use: Never    Sexual activity: Yes     Partners: Male

## 2025-05-05 NOTE — PROGRESS NOTES
Late note for 5/5/2025 from 1500 to 1700    Pt report received.  Pt was brought to floor around roughly 1500 and placed on monitor.  Oriented to room, call light, and beginning plan of care.   JAVIER Fairbanks utilized .  Pt's infant child and FOB is at bedside. Pt displays some minor discomfort when sitting down, but others reports she is not in pain.   BP as noted.   New orders received.  JAVIER Fairbanks brought to supervision attention mag bag order.   Charge RN reported that pt had to go to IMCU.   Pt made aware of change in plan.   While pt was eating supper, FOB was not at bedside and infant was present in carseat on the floor.  RN inquired of pt of her plans for the child and she stated that the FOB would be rooming in with her the night and take care of the baby himself.  RN spoke with management and then educated pt that FOB must take infant home for the night, as pt has medical needs that need to be attended to.  Pt agreed that this might be a better idea.  Pt ate a full supper and did not endorse any nausea.     RN gave report to IMCU nurse and took pt over.  JAVIER Fairbanks was then informed that the pt actually had to be admitted to ICU.   JAVIER Fairbanks explained change of plan to pt and then brought pt to ICU.  Verbal report given at bedside.   RN utilized interpretor and explained to pt the change in plans and the reasons thereof.  She was made familiar with oncoming nurse and upcoming plan for next few hours. Pt verbalized understanding  Care of pt endorsed to JAVIER Hinds as of approx 1700.      no vomiting/no weakness/no loss of consciousness/no seizure/no blurred vision

## 2025-05-05 NOTE — ED NOTES
ED TO INPATIENT SBAR HANDOFF    Patient Name: Hannah Vaz   :  1995  29 y.o.   Preferred Name  Hannah  Family/Caregiver Present yes   Restraints no   C-SSRS:    Sitter no   Sepsis Risk Score Sepsis V2 Risk Score: 8.6      Situation  Chief Complaint   Patient presents with    Shortness of Breath     Pt c/o shortness of breath, fatigue, and BLE swelling- onset after she delivered her first baby by  here on 2025. Her BP is also currently elevated at 154/106. She states her BP was not elevated during her pregnancy. She states she is currently pain free.     Brief Description of Patient's Condition: 29 y.o. female who is status post  on 2025 who presents complaining of lower extremity edema, shortness of breath.  Patient reports she was admitted for high blood pressure, states she had a  on .  She had some lower extremity edema that she states was improving for a few days but over the last 3 days it has been worsening and now she feels short of breath with walking.  Blood pressure elevated in triage 157/106, denies history of hypertension.  She does admit to headache, denies vision change.   Mental Status: oriented, alert, coherent, logical, thought processes intact, and able to concentrate and follow conversation  Arrived from: home    Imaging:   CT CHEST PULMONARY EMBOLISM W CONTRAST   Final Result      XR CHEST PORTABLE   Final Result        Abnormal labs:   Abnormal Labs Reviewed   CBC WITH AUTO DIFFERENTIAL - Abnormal; Notable for the following components:       Result Value    RBC 3.99 (*)     Hemoglobin 10.5 (*)     Hematocrit 32.9 (*)     MCH 26.3 (*)     MCHC 31.9 (*)     RDW 16.1 (*)     Neutrophils % 76 (*)     Lymphocytes % 17 (*)     Immature Granulocytes % 1 (*)     All other components within normal limits   TROPONIN - Abnormal; Notable for the following components:    Troponin, High Sensitivity 28 (*)     All other components within normal limits

## 2025-05-05 NOTE — ED NOTES
Medication History  [facility]    Patient Name: Hannah Vaz 1995     Medication history has been completed by: Radha Lama CP    Source(s) of information: patient insurance claims and  services      Primary Care Physician: No primary care provider on file.     Pharmacy: [facility]    Allergies as of 05/05/2025    (No Known Allergies)        Prior to Admission medications    Medication Sig Start Date End Date Taking? Authorizing Provider   docusate sodium (COLACE, DULCOLAX) 100 MG CAPS Take 100 mg by mouth 2 times daily 5/2/25  Yes Shaye Will, APRN - CNM   ferrous sulfate (IRON 325) 325 (65 Fe) MG tablet Take 1 tablet by mouth every other day 5/3/25  Yes Shaye Will APRN - CNLELA   ibuprofen (ADVIL;MOTRIN) 800 MG tablet Take 1 tablet by mouth in the morning and 1 tablet at noon and 1 tablet in the evening. 5/2/25  Yes Shaye Will APRN - CNLELA   labetalol (NORMODYNE) 100 MG tablet Take 1 tablet by mouth every 12 hours 5/2/25  Yes Shaye Will APRN - CNLELA   oxyCODONE (ROXICODONE) 5 MG immediate release tablet Take 1 tablet by mouth every 6 hours as needed for Pain for up to 7 days. Max Daily Amount: 20 mg 5/2/25 5/9/25 Yes Shaye Will APRN - CNLELA   metFORMIN (GLUCOPHAGE-XR) 500 MG extended release tablet Take 1 tablet by mouth 2 times daily (with meals)  Patient not taking: Reported on 5/5/2025 5/2/25   LELA Abraham MD   insulin regular (HUMULIN R;NOVOLIN R) 100 UNIT/ML injection Inject 60 Units into the skin every morning  Patient not taking: Reported on 5/5/2025    Ananya Valente MD   insulin regular (HUMULIN R;NOVOLIN R) 100 UNIT/ML injection Inject 4 Units into the skin at bedtime  Patient not taking: Reported on 5/5/2025    Ananya Valente MD     Medications removed from list (include reason, ex. noncompliance, medication cost, therapy complete etc.):   Humulin R patient reports she is not taking any insulin. Flagged for

## 2025-05-05 NOTE — PROGRESS NOTES
4 Eyes Skin Assessment     NAME:  Hannah Vaz  YOB: 1995  MEDICAL RECORD NUMBER:  [id number]    The patient is being assessed for  Transfer to New Unit    I agree that at least one RN has performed a thorough Head to Toe Skin Assessment on the patient. ALL assessment sites listed below have been assessed.      Areas assessed by both nurses:    Head, Face, Ears, Shoulders, Back, Chest, Arms, Elbows, Hands, Sacrum. Buttock, Coccyx, Ischium, Legs. Feet and Heels, and Under Medical Devices         Does the Patient have a Wound? No noted wound(s)       John Prevention initiated by RN: No  Wound Care Orders initiated by RN: No    Pressure Injury (Stage 3,4, Unstageable, DTI, NWPT, and Complex wounds) if present, place Wound referral order by RN under : No    New Ostomies, if present place, Ostomy referral order under : No     Nurse 1 eSignature: Electronically signed by Shorty De La Paz RN on 5/5/25 at 6:20 PM EDT    **SHARE this note so that the co-signing nurse can place an eSignature**    Nurse 2 eSignature: Electronically signed by Hellen Hinds RN on 5/5/25 at 6:21 PM EDT

## 2025-05-05 NOTE — CONSULTS
Name:  Hannah Vaz /Age/Sex: 1995  (29 y.o. female)   MRN & CSN:  [id number] & [id number] Admission Date/Time: 2025  9:28 AM   Location:  ED18/ED-18 PCP: No primary care provider on file.       Hospital Day: 1          Referring physician:  No admitting provider for patient encounter.         Reason for consultation: Postpartum cardiomyopathy/congestive heart failure            Thanks for referral.    Information source: Chart/staff/patient, all information obtained through     CC; shortness of breath swelling in the legs      HPI:   Thank you for involving me in taking  care of Hannah Vaz who  is a 29 y.o.year  Old female  Presents with postpartum delivered on  of this year i.e. 2025 now in with swelling in the legs up to her belly, with shortness of breath, BNP level is elevated, has no cardiac history, this is her first delivery, was also noted to be hypertensive suggestive of preeclampsia                     Past medical history:    has a past medical history of Diabetes type 2 (HCC).  Past surgical history:   has a past surgical history that includes  section (N/A, 2025).  Social History:   reports that she has never smoked. She has never been exposed to tobacco smoke. She has never used smokeless tobacco. She reports that she does not drink alcohol and does not use drugs.  Family history:  family history is not on file.    No Known Allergies    No current facility-administered medications for this encounter.    No current facility-administered medications for this encounter.     Current Outpatient Medications   Medication Sig Dispense Refill    docusate sodium (COLACE, DULCOLAX) 100 MG CAPS Take 100 mg by mouth 2 times daily 60 capsule 0    ferrous sulfate (IRON 325) 325 (65 Fe) MG tablet Take 1 tablet by mouth every other day 30 tablet 3    ibuprofen (ADVIL;MOTRIN) 800 MG tablet Take 1 tablet by mouth in the morning and 1 tablet at

## 2025-05-06 ENCOUNTER — APPOINTMENT (OUTPATIENT)
Dept: ULTRASOUND IMAGING | Age: 30
DRG: 561 | End: 2025-05-06
Payer: MEDICAID

## 2025-05-06 LAB
ALBUMIN SERPL-MCNC: 3.3 G/DL (ref 3.4–5)
ALBUMIN/GLOB SERPL: 1 {RATIO} (ref 1.1–2.2)
ALP SERPL-CCNC: 142 U/L (ref 40–129)
ALT SERPL-CCNC: 91 U/L (ref 10–40)
ANION GAP SERPL CALCULATED.3IONS-SCNC: 15 MMOL/L (ref 9–17)
AST SERPL-CCNC: 54 U/L (ref 15–37)
BILIRUB SERPL-MCNC: 0.3 MG/DL (ref 0–1)
BILIRUB UR QL STRIP: NEGATIVE
BILIRUB UR QL STRIP: NEGATIVE
BUN SERPL-MCNC: 16 MG/DL (ref 7–20)
CALCIUM SERPL-MCNC: 7.7 MG/DL (ref 8.3–10.6)
CHARACTER UR: ABNORMAL
CHARACTER UR: ABNORMAL
CHLORIDE SERPL-SCNC: 102 MMOL/L (ref 99–110)
CHOLEST SERPL-MCNC: 139 MG/DL (ref 125–199)
CLARITY UR: CLEAR
CLARITY UR: CLEAR
CO2 SERPL-SCNC: 21 MMOL/L (ref 21–32)
COLOR UR: YELLOW
COLOR UR: YELLOW
CREAT SERPL-MCNC: 0.7 MG/DL (ref 0.6–1.1)
CREAT UR-MCNC: 20.4 MG/DL (ref 28–217)
EPI CELLS #/AREA URNS HPF: 4 /HPF
EPI CELLS #/AREA URNS HPF: <1 /HPF
GFR, ESTIMATED: >90 ML/MIN/1.73M2
GLUCOSE BLD-MCNC: 104 MG/DL (ref 74–99)
GLUCOSE BLD-MCNC: 105 MG/DL (ref 74–99)
GLUCOSE BLD-MCNC: 115 MG/DL (ref 74–99)
GLUCOSE BLD-MCNC: 78 MG/DL (ref 74–99)
GLUCOSE SERPL-MCNC: 92 MG/DL (ref 74–99)
GLUCOSE UR STRIP-MCNC: NEGATIVE MG/DL
GLUCOSE UR STRIP-MCNC: NEGATIVE MG/DL
HDLC SERPL-MCNC: 44 MG/DL
HGB UR QL STRIP.AUTO: ABNORMAL
HGB UR QL STRIP.AUTO: ABNORMAL
KETONES UR STRIP-MCNC: NEGATIVE MG/DL
KETONES UR STRIP-MCNC: NEGATIVE MG/DL
LDLC SERPL CALC-MCNC: 71 MG/DL
LEUKOCYTE ESTERASE UR QL STRIP: ABNORMAL
LEUKOCYTE ESTERASE UR QL STRIP: NEGATIVE
MAGNESIUM SERPL-MCNC: 6.6 MG/DL (ref 1.8–2.4)
MICROALBUMIN UR-MCNC: 3 MG/L
MICROALBUMIN/CREAT UR-RTO: 14 MCG/MG CREAT (ref 0–2)
MUCOUS THREADS URNS QL MICRO: ABNORMAL
NITRITE UR QL STRIP: NEGATIVE
NITRITE UR QL STRIP: NEGATIVE
PH UR STRIP: 6 [PH] (ref 5–8)
PH UR STRIP: 6 [PH] (ref 5–8)
POTASSIUM SERPL-SCNC: 3.9 MMOL/L (ref 3.5–5.1)
PROT SERPL-MCNC: 6.7 G/DL (ref 6.4–8.2)
PROT UR STRIP-MCNC: NEGATIVE MG/DL
PROT UR STRIP-MCNC: NEGATIVE MG/DL
RBC #/AREA URNS HPF: 1 /HPF (ref 0–2)
RBC #/AREA URNS HPF: <1 /HPF (ref 0–2)
SODIUM SERPL-SCNC: 138 MMOL/L (ref 136–145)
SP GR UR STRIP: 1.01 (ref 1–1.03)
SP GR UR STRIP: 1.01 (ref 1–1.03)
TRIGL SERPL-MCNC: 118 MG/DL
UROBILINOGEN UR STRIP-ACNC: 0.2 EU/DL (ref 0–1)
UROBILINOGEN UR STRIP-ACNC: 0.2 EU/DL (ref 0–1)
WBC #/AREA URNS HPF: 1 /HPF (ref 0–5)
WBC #/AREA URNS HPF: 13 /HPF (ref 0–5)

## 2025-05-06 PROCEDURE — 94761 N-INVAS EAR/PLS OXIMETRY MLT: CPT

## 2025-05-06 PROCEDURE — 2060000000 HC ICU INTERMEDIATE R&B

## 2025-05-06 PROCEDURE — 83735 ASSAY OF MAGNESIUM: CPT

## 2025-05-06 PROCEDURE — 82962 GLUCOSE BLOOD TEST: CPT

## 2025-05-06 PROCEDURE — 76775 US EXAM ABDO BACK WALL LIM: CPT

## 2025-05-06 PROCEDURE — 6370000000 HC RX 637 (ALT 250 FOR IP)

## 2025-05-06 PROCEDURE — 99233 SBSQ HOSP IP/OBS HIGH 50: CPT | Performed by: INTERNAL MEDICINE

## 2025-05-06 PROCEDURE — 87086 URINE CULTURE/COLONY COUNT: CPT

## 2025-05-06 PROCEDURE — 93975 VASCULAR STUDY: CPT

## 2025-05-06 PROCEDURE — 81001 URINALYSIS AUTO W/SCOPE: CPT

## 2025-05-06 PROCEDURE — 6360000002 HC RX W HCPCS: Performed by: INTERNAL MEDICINE

## 2025-05-06 PROCEDURE — 36415 COLL VENOUS BLD VENIPUNCTURE: CPT

## 2025-05-06 PROCEDURE — 2500000003 HC RX 250 WO HCPCS: Performed by: STUDENT IN AN ORGANIZED HEALTH CARE EDUCATION/TRAINING PROGRAM

## 2025-05-06 PROCEDURE — 80053 COMPREHEN METABOLIC PANEL: CPT

## 2025-05-06 PROCEDURE — 80061 LIPID PANEL: CPT

## 2025-05-06 PROCEDURE — 2500000003 HC RX 250 WO HCPCS: Performed by: OBSTETRICS & GYNECOLOGY

## 2025-05-06 PROCEDURE — 76705 ECHO EXAM OF ABDOMEN: CPT

## 2025-05-06 PROCEDURE — 2000000000 HC ICU R&B

## 2025-05-06 PROCEDURE — 6370000000 HC RX 637 (ALT 250 FOR IP): Performed by: STUDENT IN AN ORGANIZED HEALTH CARE EDUCATION/TRAINING PROGRAM

## 2025-05-06 PROCEDURE — 6360000002 HC RX W HCPCS: Performed by: OBSTETRICS & GYNECOLOGY

## 2025-05-06 PROCEDURE — 6360000002 HC RX W HCPCS: Performed by: STUDENT IN AN ORGANIZED HEALTH CARE EDUCATION/TRAINING PROGRAM

## 2025-05-06 RX ORDER — IBUPROFEN 800 MG/1
800 TABLET, FILM COATED ORAL EVERY 6 HOURS PRN
Status: DISCONTINUED | OUTPATIENT
Start: 2025-05-06 | End: 2025-05-07 | Stop reason: HOSPADM

## 2025-05-06 RX ORDER — OXYCODONE AND ACETAMINOPHEN 5; 325 MG/1; MG/1
1 TABLET ORAL EVERY 4 HOURS PRN
Refills: 0 | Status: DISCONTINUED | OUTPATIENT
Start: 2025-05-06 | End: 2025-05-07 | Stop reason: HOSPADM

## 2025-05-06 RX ORDER — SIMETHICONE 80 MG
160 TABLET,CHEWABLE ORAL 4 TIMES DAILY
Status: DISCONTINUED | OUTPATIENT
Start: 2025-05-06 | End: 2025-05-07 | Stop reason: HOSPADM

## 2025-05-06 RX ORDER — OXYCODONE AND ACETAMINOPHEN 5; 325 MG/1; MG/1
2 TABLET ORAL EVERY 4 HOURS PRN
Refills: 0 | Status: DISCONTINUED | OUTPATIENT
Start: 2025-05-06 | End: 2025-05-07 | Stop reason: HOSPADM

## 2025-05-06 RX ADMIN — ENOXAPARIN SODIUM 40 MG: 100 INJECTION SUBCUTANEOUS at 08:19

## 2025-05-06 RX ADMIN — FUROSEMIDE 60 MG: 10 INJECTION, SOLUTION INTRAMUSCULAR; INTRAVENOUS at 17:05

## 2025-05-06 RX ADMIN — IBUPROFEN 800 MG: 800 TABLET, FILM COATED ORAL at 10:43

## 2025-05-06 RX ADMIN — MAGNESIUM SULFATE HEPTAHYDRATE 2000 MG/HR: 40 INJECTION, SOLUTION INTRAVENOUS at 06:04

## 2025-05-06 RX ADMIN — SIMETHICONE 160 MG: 80 TABLET, CHEWABLE ORAL at 21:16

## 2025-05-06 RX ADMIN — LABETALOL HYDROCHLORIDE 100 MG: 100 TABLET, FILM COATED ORAL at 21:21

## 2025-05-06 RX ADMIN — SODIUM CHLORIDE, PRESERVATIVE FREE 10 ML: 5 INJECTION INTRAVENOUS at 21:22

## 2025-05-06 RX ADMIN — SIMETHICONE 160 MG: 80 TABLET, CHEWABLE ORAL at 11:54

## 2025-05-06 RX ADMIN — LABETALOL HYDROCHLORIDE 100 MG: 100 TABLET, FILM COATED ORAL at 08:19

## 2025-05-06 RX ADMIN — FUROSEMIDE 60 MG: 10 INJECTION, SOLUTION INTRAMUSCULAR; INTRAVENOUS at 08:19

## 2025-05-06 ASSESSMENT — PAIN - FUNCTIONAL ASSESSMENT: PAIN_FUNCTIONAL_ASSESSMENT: ACTIVITIES ARE NOT PREVENTED

## 2025-05-06 ASSESSMENT — PAIN SCALES - GENERAL
PAINLEVEL_OUTOF10: 5
PAINLEVEL_OUTOF10: 2

## 2025-05-06 ASSESSMENT — PAIN DESCRIPTION - DESCRIPTORS: DESCRIPTORS: ACHING

## 2025-05-06 ASSESSMENT — PAIN DESCRIPTION - FREQUENCY: FREQUENCY: INTERMITTENT

## 2025-05-06 ASSESSMENT — PAIN DESCRIPTION - ONSET: ONSET: ON-GOING

## 2025-05-06 ASSESSMENT — PAIN DESCRIPTION - PAIN TYPE: TYPE: ACUTE PAIN

## 2025-05-06 ASSESSMENT — PAIN DESCRIPTION - ORIENTATION: ORIENTATION: LOWER

## 2025-05-06 ASSESSMENT — PAIN DESCRIPTION - LOCATION: LOCATION: ABDOMEN;BACK

## 2025-05-06 ASSESSMENT — PAIN SCALES - WONG BAKER: WONGBAKER_NUMERICALRESPONSE: NO HURT

## 2025-05-06 NOTE — DISCHARGE INSTRUCTIONS
- please schedule an appointment to see your PCP  - please schedule an appointment to see cardiology, and ob-gyn  - please go to lab in one week for blood work  - please take medications as prescribed    Sèvis nan [facility]  2-1-1:   Ciarra neely anna temple  [phone] o 2-1-1  [email or web address]    Sam Schneider:  koupon chinedu willoughby, jany norton, gadri sibvansyone, [facility]  [phone]    [facility]:   kortney willoughby lwestelita, rad ak mèb  [phone] or [phone]    Jonathanalida santiagoodalysisabels:   kortney willoughby lwestelita, rad ak mèb  [phone]    [facility]:     [facility]  [address] [place] [address]  [phone]     diogenes schneider yo  [address] [place] [address]  [phone]     [facility]:   [facility] sante pedyatrik, [facility] mirandani ou  [address] [place], [place]   [phone] o [phone]    [facility]ish [facility]:  angie che [facility]: [phone]  [email or web address]: currygrace che [facility]: [facility] sante pedyatrik, [facility] Hospital[facility]le castle selon revni ou  [address]   [place] [address]  [phone]    [facility] [place]:  Nitrisyon chinedu hough selon revni  [address] [place], [place] [address] [phone]    [facility]  [facility]:  ofri sèvis otobis [facility]. ADA ofri sèvis pòt an pòt chinedu moun ki  andikap  [phone]    vIan delvalle vwayaj:    [phone]    [facility]: mariely jimenez   [phone]    [facility] [employer] you [employer]  Cardiovascular Special Procedures  Port Removal  Patient Discharge Instructions      Maintain dressing after the procedure for 48 - 72 hours and then apply a new dressing supplied to you. Leave dressing on for a total of 4-5 days. If the dressing becomes moist, you should change the dressing. If there is any leakage at the incision site, notify your doctor. Keep site clean and dry for five (5) days and observe for any signs of infection. Leave steri-strips (strips of tape along the incision) on until the edges become loose. The steri-strips can then be peeled off the skin. Bruising and mild discomfort are expected. You may apply an ice bag to the incision to minimize bruising, swelling, or discomfort. Call for any bleeding or extreme pain. Other symptoms to report are as follows:    Fever  Skin warm to touch  Numbness or weakness  Swelling of neck or arm  Redness or Tenderness  Drainage or if dressing is damp or saturated    Refrain from heavy lifting greater than 10 pounds for 10-14 days. Strenuous activities and exercise should be approached gradually. You may contact [address] [place], [place] [email or web address] [facility]. for any questions or problems that may occur at [phone] during the hours of 9am-4pm Monday-Friday, or the hospital  after hours at [phone], to have the interventional radiologist on call paged. [facility]  Cardiovascular Special Procedures  General Discharge Instructions    PROCEDURE: port removal    __X__ You may be drowsy or lightheaded after receiving sedation.  DO NOT operate a vehicle (automobile, bicycle, motorcycle, machinery, or power tools), make any important decisions or sign any important/legal documents, or drink alcoholic beverages for the next 24 hours  __X__ We strongly suggest that a responsible adult be with you for the next 24 hours for your protection and safety  ____ If the intravenous catheter site is painful, apply warm wet compresses on the site until the soreness is relieved and elevate the arm above the heart. Call your physician if no improvement in 2 to 3 days    DIETARY INSTRUCTIONS:    ____ Drink extra fluids over the next 24 hours (If not contraindicated by illness or by physician order)  ____ Start with clear liquids and progress to normal diet as you feel like eating. If you experience nausea or repeated episodes of vomiting, which persist beyond 12-24 hours, notify your doctor        __X__ Resume your previous diet    ACTIVITY INSTRUCTIONS:    ____ See other instructions  ____ No special instructions  ____ Rest for 24 hours    ____ Up as tolerated  __X__ Increase activity as tolerated    Wound/Dressing Instructions:  ____ See other instructions  ____ May shower, tomorrow  ____ Remove bandage within 24 hours    MEDICATION INSTRUCTIONS:    ____ See Medication Reconciliation Sheet        FOLLOW-UP APPOINTMENT    Follow up with MD in as directed. Belongings returned to patient and/or family: Yes. The Discharge Instructions have been explained to me. I understand and can verbalize these instructions.

## 2025-05-06 NOTE — PROGRESS NOTES
Through       CARDIOLOGY  NOTE        Name:  Hannah Vaz /Age/Sex: 1995  (29 y.o. female)   MRN & CSN:  [id number] & [id number] Admission Date/Time: 2025  9:28 AM   Location:  -A PCP: No primary care provider on file.       Hospital Day: 2        PLAN FROM CARDIOLOGY FOR TODAY:   Continue diuresis      - cardiology consult is for: Postpartum cardiomyopathy    -  Interval history: Additional diuretics given    ASSESSMENT/ PLAN:      Postpartum cardiomyopathy with fluid retention elevated BNP level will obtain an echocardiogram will diurese and depending on that we will add GDMT as tolerated given that she is postpartum can start her on ACE inhibitor or ARB  Patient diuresed  Limit IV fluids      Preeclampsia will control her blood pressure as well she is on nifedipine which will be continued     Per records has diabetes as well is being managed by endocrinology on metformin                   Subjective:      Todays complain: Patient mild shortness of breath    HPI:  Hannah is a 29 y.o.year old who and presents with had concerns including Shortness of Breath (Pt c/o shortness of breath, fatigue, and BLE swelling- onset after she delivered her first baby by  here on 2025. Her BP is also currently elevated at 154/106. She states her BP was not elevated during her pregnancy. She states she is currently pain free.).  Chief Complaint   Patient presents with    Shortness of Breath     Pt c/o shortness of breath, fatigue, and BLE swelling- onset after she delivered her first baby by  here on 2025. Her BP is also currently elevated at 154/106. She states her BP was not elevated during her pregnancy. She states she is currently pain free.           Objective: Temperature:  Current - Temp: 98.2 °F (36.8 °C); Max - Temp  Av.3 °F (36.8 °C)  Min: 97.7 °F (36.5 °C)  Max: 98.5 °F (36.9 °C)    Respiratory Rate

## 2025-05-06 NOTE — PROGRESS NOTES
V2.0  [facility] [place]      Name:  Hannah Vaz /Age/Sex: 1995  (29 y.o. female)   MRN & CSN:  [id number] & [id number] Encounter Date/Time: 2025 1:03 PM EDT    Location:  -A PCP: No primary care provider on file.       Hospital Day: 2      Subjective:     Chief Complaint:  Shortness of Breath (Pt c/o shortness of breath, fatigue, and BLE swelling- onset after she delivered her first baby by  here on 2025. Her BP is also currently elevated at 154/106. She states her BP was not elevated during her pregnancy. She states she is currently pain free.)     Patient feels better today.  Denies chest pain.  Complains of bloating and back pain.   BP is better controlled.  Patient reports dysuria started last night.        Assessment and Plan:   Pre-eclampsia w severe features  Post-partum cardiomyopathy   P/w shortness of breath and bilateral LE edema  CTPA suggestive of volume overload    ECHO EF 55-60%, unremarkable overall  No CKD no proteinuria.   - Cards consulted  - BP control  - was on mg infusion per ob-gyn for seizure prophylaxis now off  - lasix 40 mg IV bid  - Strict I's/O.  Standing weight  - resume home labetalol  - now on IV BP PRN meds per ob-gyn  - check renal u/s       Transaminitis  AST/ALT 82/113 coming down  Suspect from preeclampsia  - check abd u/s  -Monitor    Type II diabetes. On home insulin but not taking . Last HA1C 7.9 2025  - hypoglycemia protocol  - MDSSI      Recent  delivery  Ob-gyn consulted    Personally reviewed Lab Studies and Imaging     Discussed management of the case with cardiology who recommended lasix IV      Drugs that require monitoring for toxicity include lasix IV and the method of monitoring was renal      Diet Diet NPO   DVT Prophylaxis [x] Lovenox, []  Heparin, [] SCDs, [] Ambulation,  [] Eliquis, [] Xarelto  [] Coumadin   Code Status Full Code   Disposition From: home  Expected Disposition: home  Estimated

## 2025-05-06 NOTE — CARE COORDINATION
Cm and , Jaiden JULIEN, met with pt to initiate discharge planning and to review readmission for possible areas of improvement. Cm introduced self and role of CM. Pt delivered baby girl by c section 4/29 and was discharged to home on 5/2 with car seat and pack n play. Pt readmitted  5/5 with SOB and edema. Pt and S.O reside together and they have access to a working vehicle for transportation. S.O generally works outside the home but is in between jobs related to need now to be home and care for new infant with mother of baby is in hospital. Pt states that she is receiving/connected with WIC and receives foodstamp assistance. Pt asked if [facility] could provide a couple extra pairs of post surg underwear and some extra diapers for infant. Cm contacted [facility] LSW who will bring these items to pt today. Cm did provide address and ph# for [facility] for additional or emergency baby supply needs. Pt states that the couple had appt for baby yesterday but missed the appt due to mom needing to come to hospital. Mother provided with ph# for [facility] and states that she is comfortable calling and rescheduling the baby's appt. Pt has insurance to assist with medical costs. Resources in [place] added to pt discharge instructions should they be needed. Plan home upon discharge.   05/06/25 1304   Service Assessment   Patient Orientation Alert and Oriented   Cognition Alert   History Provided By Patient;Medical Record   Primary Caregiver Self   Accompanied By/Relationship N/A   Support Systems Spouse/Significant Other   Patient's Healthcare Decision Maker is: Legal Next of Kin   PCP Verified by CM No   Prior Functional Level Independent in ADLs/IADLs   Current Functional Level Assistance with the following:;Mobility  (per hospital protocol)   Can patient return to prior living arrangement Yes   Ability to make needs known: Good   Family able to assist with home care needs: Yes   Would you like for me to

## 2025-05-07 VITALS
HEIGHT: 62 IN | WEIGHT: 149.91 LBS | OXYGEN SATURATION: 96 % | SYSTOLIC BLOOD PRESSURE: 97 MMHG | TEMPERATURE: 98 F | DIASTOLIC BLOOD PRESSURE: 69 MMHG | HEART RATE: 80 BPM | RESPIRATION RATE: 22 BRPM | BODY MASS INDEX: 27.59 KG/M2

## 2025-05-07 LAB
ALBUMIN SERPL-MCNC: 3.3 G/DL (ref 3.4–5)
ALBUMIN/GLOB SERPL: 1 {RATIO} (ref 1.1–2.2)
ALP SERPL-CCNC: 127 U/L (ref 40–129)
ALT SERPL-CCNC: 81 U/L (ref 10–40)
ANION GAP SERPL CALCULATED.3IONS-SCNC: 14 MMOL/L (ref 9–17)
AST SERPL-CCNC: 47 U/L (ref 15–37)
BILIRUB SERPL-MCNC: 0.4 MG/DL (ref 0–1)
BNP SERPL-MCNC: 131 PG/ML (ref 0–125)
BUN SERPL-MCNC: 24 MG/DL (ref 7–20)
CALCIUM SERPL-MCNC: 9.2 MG/DL (ref 8.3–10.6)
CHLORIDE SERPL-SCNC: 104 MMOL/L (ref 99–110)
CO2 SERPL-SCNC: 22 MMOL/L (ref 21–32)
CREAT SERPL-MCNC: 0.7 MG/DL (ref 0.6–1.1)
GFR, ESTIMATED: >90 ML/MIN/1.73M2
GLUCOSE BLD-MCNC: 89 MG/DL (ref 74–99)
GLUCOSE BLD-MCNC: 89 MG/DL (ref 74–99)
GLUCOSE SERPL-MCNC: 93 MG/DL (ref 74–99)
MAGNESIUM SERPL-MCNC: 2.4 MG/DL (ref 1.8–2.4)
MICROORGANISM SPEC CULT: ABNORMAL
MICROORGANISM SPEC CULT: ABNORMAL
POTASSIUM SERPL-SCNC: 4.1 MMOL/L (ref 3.5–5.1)
PROT SERPL-MCNC: 6.6 G/DL (ref 6.4–8.2)
SODIUM SERPL-SCNC: 141 MMOL/L (ref 136–145)
SPECIMEN DESCRIPTION: ABNORMAL

## 2025-05-07 PROCEDURE — 99233 SBSQ HOSP IP/OBS HIGH 50: CPT | Performed by: INTERNAL MEDICINE

## 2025-05-07 PROCEDURE — 83880 ASSAY OF NATRIURETIC PEPTIDE: CPT

## 2025-05-07 PROCEDURE — 2500000003 HC RX 250 WO HCPCS: Performed by: OBSTETRICS & GYNECOLOGY

## 2025-05-07 PROCEDURE — 2500000003 HC RX 250 WO HCPCS: Performed by: STUDENT IN AN ORGANIZED HEALTH CARE EDUCATION/TRAINING PROGRAM

## 2025-05-07 PROCEDURE — 80053 COMPREHEN METABOLIC PANEL: CPT

## 2025-05-07 PROCEDURE — 6370000000 HC RX 637 (ALT 250 FOR IP): Performed by: INTERNAL MEDICINE

## 2025-05-07 PROCEDURE — 83735 ASSAY OF MAGNESIUM: CPT

## 2025-05-07 PROCEDURE — 6360000002 HC RX W HCPCS: Performed by: INTERNAL MEDICINE

## 2025-05-07 PROCEDURE — 6360000002 HC RX W HCPCS: Performed by: STUDENT IN AN ORGANIZED HEALTH CARE EDUCATION/TRAINING PROGRAM

## 2025-05-07 PROCEDURE — 82962 GLUCOSE BLOOD TEST: CPT

## 2025-05-07 PROCEDURE — 94761 N-INVAS EAR/PLS OXIMETRY MLT: CPT

## 2025-05-07 PROCEDURE — 6370000000 HC RX 637 (ALT 250 FOR IP)

## 2025-05-07 RX ORDER — FUROSEMIDE 20 MG/1
20 TABLET ORAL DAILY
Qty: 30 TABLET | Refills: 0 | Status: SHIPPED | OUTPATIENT
Start: 2025-05-07 | End: 2025-06-06

## 2025-05-07 RX ORDER — LABETALOL 100 MG/1
200 TABLET, FILM COATED ORAL EVERY 12 HOURS SCHEDULED
Status: DISCONTINUED | OUTPATIENT
Start: 2025-05-07 | End: 2025-05-07 | Stop reason: HOSPADM

## 2025-05-07 RX ORDER — AMOXICILLIN 500 MG/1
500 CAPSULE ORAL EVERY 8 HOURS SCHEDULED
Qty: 30 CAPSULE | Refills: 0 | Status: SHIPPED | OUTPATIENT
Start: 2025-05-07 | End: 2025-05-17

## 2025-05-07 RX ORDER — SIMETHICONE 80 MG
160 TABLET,CHEWABLE ORAL 4 TIMES DAILY
Qty: 180 TABLET | Refills: 3 | Status: SHIPPED | OUTPATIENT
Start: 2025-05-07

## 2025-05-07 RX ORDER — LABETALOL 100 MG/1
200 TABLET, FILM COATED ORAL EVERY 12 HOURS SCHEDULED
Qty: 120 TABLET | Refills: 3 | Status: SHIPPED | OUTPATIENT
Start: 2025-05-07

## 2025-05-07 RX ORDER — AMOXICILLIN 500 MG/1
500 CAPSULE ORAL EVERY 8 HOURS SCHEDULED
Status: DISCONTINUED | OUTPATIENT
Start: 2025-05-07 | End: 2025-05-07 | Stop reason: HOSPADM

## 2025-05-07 RX ADMIN — ENOXAPARIN SODIUM 40 MG: 100 INJECTION SUBCUTANEOUS at 09:36

## 2025-05-07 RX ADMIN — SODIUM CHLORIDE, PRESERVATIVE FREE 10 ML: 5 INJECTION INTRAVENOUS at 09:43

## 2025-05-07 RX ADMIN — FUROSEMIDE 60 MG: 10 INJECTION, SOLUTION INTRAMUSCULAR; INTRAVENOUS at 09:36

## 2025-05-07 RX ADMIN — SIMETHICONE 160 MG: 80 TABLET, CHEWABLE ORAL at 09:35

## 2025-05-07 RX ADMIN — LABETALOL HYDROCHLORIDE 200 MG: 100 TABLET, FILM COATED ORAL at 09:42

## 2025-05-07 NOTE — PLAN OF CARE
Problem: Chronic Conditions and Co-morbidities  Goal: Patient's chronic conditions and co-morbidity symptoms are monitored and maintained or improved  5/6/2025 2334 by Tristan Shelton RN  Outcome: Progressing  Flowsheets (Taken 5/6/2025 2000)  Care Plan - Patient's Chronic Conditions and Co-Morbidity Symptoms are Monitored and Maintained or Improved: Monitor and assess patient's chronic conditions and comorbid symptoms for stability, deterioration, or improvement  5/6/2025 1617 by Kimberley Vincent, RN  Outcome: Progressing     Problem: Discharge Planning  Goal: Discharge to home or other facility with appropriate resources  5/6/2025 2334 by Tristan Shelton RN  Outcome: Progressing  Flowsheets (Taken 5/6/2025 2000)  Discharge to home or other facility with appropriate resources: Identify barriers to discharge with patient and caregiver  5/6/2025 1617 by Kimberley Vincent, RN  Outcome: Progressing     Problem: Pain  Goal: Verbalizes/displays adequate comfort level or baseline comfort level  5/6/2025 2334 by Tristan Shelton RN  Outcome: Progressing  Flowsheets (Taken 5/6/2025 2000)  Verbalizes/displays adequate comfort level or baseline comfort level: Encourage patient to monitor pain and request assistance  5/6/2025 1617 by Kimberley Vincent, RN  Outcome: Progressing

## 2025-05-07 NOTE — PROGRESS NOTES
Outpatient Pharmacy Progress Note for Meds-to-Beds    Total number of Prescriptions Filled: 4    Additional Documentation:  Medication(s) were delivered to the patient's room prior to discharge      Thank you for letting us serve your patients.  [address] [address]    Phone: [phone]    Fax: [phone]

## 2025-05-07 NOTE — PROGRESS NOTES
Through  provided in the room      CARDIOLOGY  NOTE        Name:  Hannah Vaz /Age/Sex: 1995  (29 y.o. female)   MRN & CSN:  [id number] & [id number] Admission Date/Time: 2025  9:28 AM   Location:  -A PCP: No primary care provider on file.       Hospital Day: 3        PLAN FROM CARDIOLOGY FOR TODAY:   Continue diuresis      - cardiology consult is for: Postpartum cardiomyopathy    -  Interval history: Blood pressure better edema resolved    ASSESSMENT/ PLAN:      Postpartum cardiomyopathy   Has been diuresed her blood pressure is better we will continue with present medical therapy      Preeclampsia will control her blood pressure as well she is on labetalol which will be continued  Increase the dose of labetalol as tolerated     Per records has diabetes as well is being managed by endocrinology was on metformin                   Subjective:      Todays complain: Patient mild shortness of breath    HPI:  Hannah is a 29 y.o.year old who and presents with had concerns including Shortness of Breath (Pt c/o shortness of breath, fatigue, and BLE swelling- onset after she delivered her first baby by  here on 2025. Her BP is also currently elevated at 154/106. She states her BP was not elevated during her pregnancy. She states she is currently pain free.).  Chief Complaint   Patient presents with    Shortness of Breath     Pt c/o shortness of breath, fatigue, and BLE swelling- onset after she delivered her first baby by  here on 2025. Her BP is also currently elevated at 154/106. She states her BP was not elevated during her pregnancy. She states she is currently pain free.           Objective: Temperature:  Current - Temp: 98.5 °F (36.9 °C); Max - Temp  Av.3 °F (36.8 °C)  Min: 97.7 °F (36.5 °C)  Max: 98.7 °F (37.1 °C)    Respiratory Rate : Resp  Av  Min: 20  Max: 26    Pulse Range: Pulse  Avg:

## 2025-05-07 NOTE — DISCHARGE SUMMARY
Discharge Summary    Name:  Hannah Vaz /Age/Sex: 1995  (29 y.o. female)   MRN & CSN:  [id number] & [id number] Admission Date/Time: 2025  9:28 AM  Discharge Date/Time: 2025 1:05 PM EDT   Attending:  Yusuf Baptiste MD Discharging Physician: Yusuf Baptiste MD     Hospital Course:   Hannah Vaz is a 29 y.o.  female  who presents with Postpartum cardiomyopathy    HPI  Hannah Vaz is a 29 y.o. female with PMH of DM II, gestational HTN , recent  delivery who presents with shortness of breath and swelling in her legs up to her belly which started after the C section. No cough. She also reports chest pain on exertion and also worse on deep breath.      At ED, pt was afebrile, hemodynamically stable 's; on room air. Labs unremarkable; HCO3 18, troponin 28-> 24, , Hgb 10.5.  CTPA no PE with findings suggestive of CHF.  ED consulted cardiology.  Patient received 1 dose of Lasix 20 mg IV. Discussed with ED, will admit pt to med surg  for CHF  workup and management.           The following problems have been addressed during this hospitalization.  Pre-eclampsia w severe features  Post-partum cardiomyopathy   P/w shortness of breath and bilateral LE edema  CTPA suggestive of volume overload    ECHO EF 55-60%, unremarkable overall  No CKD. no proteinuria. No cirrhosis.   renal u/s unremarkable  - Cards consulted  - BP management  - was on mg infusion per ob-gyn for seizure prophylaxis now off  - was lasix IV discharged on Lasix 20 mg p.o. daily per cardiology  - home labetalol dose increased to 200 mg twice daily       Transaminitis  AST/ALT 82/113 coming down  Suspect from preeclampsia  abd u/s unremarkable  -Monitor repeat outpatient.      Type II diabetes vs gestational diabetes.   Was on home insulin and metformin but is not taking anymore. Last HA1C 7.9 2025  Glucose has been under control  Patient is not on any home regimen  Patient to follow-up with her PCP to monitor HbA1c

## 2025-05-07 NOTE — PROGRESS NOTES
Admit Date: 2025  Hospital day 1    Subjective:       Patient is doing well, ambulating without assistance, voiding freely and passing flatus.  No abdominal complaints.    Scheduled Meds:   simethicone  160 mg Oral 4x Daily    insulin lispro  0-8 Units SubCUTAneous 4x Daily AC & HS    sodium chloride flush  5-40 mL IntraVENous 2 times per day    enoxaparin  40 mg SubCUTAneous Daily    sodium chloride flush  5-40 mL IntraVENous 2 times per day    labetalol  100 mg Oral 2 times per day    furosemide  60 mg IntraVENous BID     Continuous Infusions:   dextrose      sodium chloride       PRN Meds:oxyCODONE-acetaminophen **OR** oxyCODONE-acetaminophen, ibuprofen, glucose, dextrose bolus **OR** dextrose bolus, glucagon (rDNA), dextrose, sodium chloride flush, sodium chloride, ondansetron **OR** ondansetron, polyethylene glycol, acetaminophen **OR** acetaminophen, potassium chloride **OR** potassium alternative oral replacement **OR** potassium chloride, sodium chloride flush, calcium gluconate, labetalol, labetalol, labetalol, hydrALAZINE        Objective:     Patient Vitals for the past 8 hrs:   BP Temp Temp src Pulse Resp SpO2   25 0000 (!) 131/92 98.1 °F (36.7 °C) Oral 81 22 --   25 2200 (!) 127/93 -- -- 93 26 95 %   25 2121 (!) 136/96 -- -- 99 -- --   25 2000 (!) 126/95 98.7 °F (37.1 °C) Oral 85 24 97 %     I/O last 3 completed shifts:  In: 1456.5 [P.O.:360; I.V.:1096.5]  Out: 2950 [Urine:2950]  I/O this shift:  In: 480 [P.O.:480]  Out: 700 [Urine:700]        Assessment:     Principal Problem:    Postpartum cardiomyopathy  Resolved Problems:    * No resolved hospital problems. *  Postpartum preeclampsia    Plan:     Magnesium is off at this stage.  Patient appears to be doing well.  Continue with routine post  section care from an OB/GYN standpoint.  Will continue to follow.    Jose Izaguirre MD 2025 1:34 AM

## 2025-05-07 NOTE — PROGRESS NOTES
Met with patient and introduced myself as the Heart failure education R.N. through .     Admitting diagnosis- post partum cardiomyopathy  Cardiologist-  Wendy  Heart Failure Education Nurse consulted- Yes   Ejection fraction 55-60 % as of 5/5  Pro BNP- 619 on 5/5  Hospital follow up appt-  5/19 with Dr. Nguyen  Patient informed of appointment and appointment added to AVS  Smoking Cessation information and referral-N/A   Pneumonia vaccine- none  PCP- OB GYN  Patient has a digital scale? Given to patient   Transportation- has transportation    Able to obtain medications without difficulty?- Yes- Patient denies difficulty in obtaining or taking medications.   Heart failure specific Medications- Labetolol, Apresoline. Lasix  Reviewed Heart failure patient education book with patient. Heart failure education included: Type of heart failure, How it is diagnosed, causes, symptoms, medications, diet, daily weights, exercise and fluid control. .  Questions answered.      Reviewed the Stop Light Handout with patient and instructed when to call her provider.   Patient was engaged and attentive during education session.    Heart failure education handout in [place] given to patient.

## 2025-05-14 NOTE — PROGRESS NOTES
of Procedure:   Cardiac Clearance  ([place] [facility])   Needs Further Workup Not Cleared Cleared ([place] [facility] below)     Low Risk     Moderate Risk     High Risk   Additional Notes:   Consult/ Referral   CT Surgery Electrophysiology TAVR Coordination (Jeanne Peabody) Heart Failure Center Venous Ablation Consult Watchman Consult  ([place] [facility])        Radha Zavala   Additional Notes:   Labs Date Last Completed Date Expected Retrieve from outside source?    BMP       BNP 5/25      CBC 52/5      INR       Lipid 5/25      TSH 5/25      A1C       Other:      Additional Notes:   Medication/Samples Type of Medication Additional Notes    Samples of      Paper script      Research Study     Other:   Next Office Visit  ([facility])   1 month 3 months 6 months 9 months 1 year PRN 1 week 2 weeks   Additional Notes:

## 2025-05-19 ENCOUNTER — OFFICE VISIT (OUTPATIENT)
Dept: CARDIOLOGY CLINIC | Age: 30
End: 2025-05-19
Payer: MEDICAID

## 2025-05-19 VITALS
DIASTOLIC BLOOD PRESSURE: 64 MMHG | HEIGHT: 62 IN | HEART RATE: 86 BPM | SYSTOLIC BLOOD PRESSURE: 116 MMHG | WEIGHT: 141 LBS | BODY MASS INDEX: 25.95 KG/M2

## 2025-05-19 DIAGNOSIS — Z09 HOSPITAL DISCHARGE FOLLOW-UP: Primary | ICD-10-CM

## 2025-05-19 PROCEDURE — 99213 OFFICE O/P EST LOW 20 MIN: CPT | Performed by: INTERNAL MEDICINE

## 2025-05-19 PROCEDURE — 1111F DSCHRG MED/CURRENT MED MERGE: CPT | Performed by: INTERNAL MEDICINE

## 2025-05-19 NOTE — PROGRESS NOTES
CLINICAL STAFF DOCUMENTATION    Dr. Yessi [facility]  1995  [id number]    Have you had any Chest Pain recently? - Yes, but none since discharge from the hospital.   When did the pain begin? - only within the past few weeks, during and right after her    What type of pain is it? -  aching pain  but not severe enough to rate it     Have you had any Shortness of Breath - No    Have you had any dizziness - No    Have you had any palpitations recently? - No    Any thyroid issues? - No    Do you have any edema - swelling in legs but much better now         When did you have your last labs drawn 2025  What doctor ordered [facility]   Do we have the labs in their chart Yes    Do you need any prescriptions refilled? - No    Do you have a surgery or procedure scheduled in the near future - No      Do use tobacco products? - No  Do you drink alcohol? - No  Do you use any illicit drugs? - No  Caffeine? - Yes      Check medication list thoroughly!!! AND RECONCILE OUTSIDE MEDICATIONS  If dose has changed change the entire order not just the MG  BE SURE TO ASK PATIENT IF THEY NEED MEDICATION REFILLS  Verify Pharmacy and update if incorrect  Add to every patient's \"wrap up\" the following dot phrase AFTERVISITCARDIOHEARTHOUSE and ensure we explain this to our patients   
    Objective:    Vitals:    05/19/25 1451   BP: 116/64   BP Site: Left Upper Arm   Patient Position: Sitting   BP Cuff Size: Medium Adult   Pulse: 86   Weight: 64 kg (141 lb)   Height: 1.575 m (5' 2\")     /64 (BP Site: Left Upper Arm, Patient Position: Sitting, BP Cuff Size: Medium Adult)   Pulse 86   Ht 1.575 m (5' 2\")   Wt 64 kg (141 lb)   BMI 25.79 kg/m²          No data to display               Physical Exam             Wt Readings from Last 3 Encounters:   05/19/25 64 kg (141 lb)   05/05/25 61.2 kg (135 lb)   05/06/25 68 kg (149 lb 14.6 oz)     Body mass index is 25.79 kg/m².  GENERAL - Alert, oriented, pleasant, in no apparent distress.  EYES: No jaundice, no conjunctival pallor.  SKIN: It is warm & dry. No rashes. No Echhymosis    HEENT - No clinically significant abnormalities seen.  Neck - Supple.  No jugular venous distention noted. No carotid bruits.   Cardiovascular - Normal S1 and S2 without obvious murmur or gallop.    Extremities - No cyanosis, clubbing, or significant edema.    Pulmonary - No respiratory distress.  No wheezes or rales.    Abdomen - No masses, tenderness, or organomegaly.  Musculoskeletal - No significant edema. No joint deformities. No muscle wasting.  Neurologic - Cranial nerves II through XII are grossly intact.  There were no gross focal neurologic abnormalities.    Lab Review   No results found for: \"CKTOTAL\", \"CKMB\", \"CKMBINDEX\", \"TROPONINT\"  BNP:  No results found for: \"BNP\"  PT/INR:  No results found for: \"INR\"  No results found for: \"LABA1C\"  Lab Results   Component Value Date    WBC 8.8 05/05/2025    HGB 10.5 (L) 05/05/2025    HCT 32.9 (L) 05/05/2025    MCV 82.5 05/05/2025     05/05/2025     Lab Results   Component Value Date    CHOL 139 05/06/2025    TRIG 118 05/06/2025    HDL 44 05/06/2025     Lab Results   Component Value Date    ALT 81 (H) 05/07/2025    AST 47 (H) 05/07/2025     BMP:    Lab Results   Component Value Date/Time     05/07/2025 05:30

## 2025-05-19 NOTE — PATIENT INSTRUCTIONS
Thank you for allowing us to care for you today!   We want to ensure we can follow your treatment plan and we strive to give you the best outcomes and experience possible.   If you ever have a life threatening emergency and call 911 - for an ambulance (EMS)  REMEMBER  Our providers can only care for you at:   [facility] or [facility]   Even if you have someone take you or you drive yourself we can only care for you in a [facility] facility. Our providers are not setup at the other healthcare locations!    PLEASE CALL OUR OFFICE DURING NORMAL BUSINESS HOURS  Monday through Friday 8 am to 5 pm  AFTER HOURS the physician on-call cannot help with scheduling, rescheduling, procedure instruction questions or any type of medication need or issue.  [facility] P:[phone] - [facility] P:[phone] - [facility] P:[phone]      If you receive a survey:  We would appreciate you taking the time to share your experience concerning your provider visit in the office.    These surveys are confidential!  We are eager to improve and are counting on you to share your feedback so we can ensure you get the best care possible.

## 2025-08-12 ENCOUNTER — OFFICE VISIT (OUTPATIENT)
Dept: CARDIOLOGY CLINIC | Age: 30
End: 2025-08-12
Payer: MEDICAID

## 2025-08-12 VITALS
WEIGHT: 144 LBS | SYSTOLIC BLOOD PRESSURE: 124 MMHG | DIASTOLIC BLOOD PRESSURE: 74 MMHG | BODY MASS INDEX: 25.52 KG/M2 | HEIGHT: 63 IN | HEART RATE: 84 BPM

## 2025-08-12 PROCEDURE — 99214 OFFICE O/P EST MOD 30 MIN: CPT | Performed by: INTERNAL MEDICINE

## (undated) DEVICE — GOWN,SIRUS,POLYRNF,BRTHSLV,LG,30/CS: Brand: MEDLINE

## (undated) DEVICE — SUTURE MONOCRYL SZ 3-0 L27IN ABSRB UD L60MM KS STR REV CUT Y523H

## (undated) DEVICE — CONTAINER SPEC 172OZ MP STOR

## (undated) DEVICE — GLOVE ORANGE PI 7 1/2   MSG9075

## (undated) DEVICE — TOWEL,OR,DSP,ST,BLUE,STD,6/PK,12PK/CS: Brand: MEDLINE

## (undated) DEVICE — GOWN,ECLIPSE,POLYRNF,BRTHSLV,L,30/CS: Brand: MEDLINE

## (undated) DEVICE — SUTURE VICRYL ABSRB BRAID COAT UD CTX 3-0 36IN  J980H

## (undated) DEVICE — GLOVE SURG SZ 65 THK91MIL LTX FREE SYN POLYISOPRENE

## (undated) DEVICE — CLEANER,CAUTERY TIP,2X2",STERILE: Brand: MEDLINE

## (undated) DEVICE — STRIP SKIN CLSR W1XL5IN NYLON REINF CURAD STERIL

## (undated) DEVICE — BLOOD TRANSFER DEVICE: Brand: MEDLINE

## (undated) DEVICE — BABY BLANKET KIT: Brand: MEDLINE INDUSTRIES, INC.

## (undated) DEVICE — Device

## (undated) DEVICE — SENSOR PLSE OXMTR AD CBL L3FT ADH TRANSMISSIVE

## (undated) DEVICE — PENCIL SMK EVAC 15FT BLADE ELECTRD ROCKER F/TELSCP

## (undated) DEVICE — SUTURE VICRYL 0 L36IN ABSRB VLT V-34 BRAID COAT J518H

## (undated) DEVICE — STRIP,CLOSURE,WOUND,MEDI-STRIP,1/2X4: Brand: MEDLINE

## (undated) DEVICE — GARMENT,MEDLINE,DVT,INT,CALF,MED, GEN2: Brand: MEDLINE

## (undated) DEVICE — SOLUTION IRRIG 1000ML 0.9% SOD CHL USP POUR PLAS BTL

## (undated) DEVICE — SINGLE PORT MANIFOLD: Brand: NEPTUNE 2

## (undated) DEVICE — SPONGE GZ W4XL8IN COT WVN 12 PLY

## (undated) DEVICE — SOLUTION IRRIG 1000ML STRL H2O USP PLAS POUR BTL

## (undated) DEVICE — DRESSING TRNSPAR W8XL12IN FLM SURESITE 123

## (undated) DEVICE — ELECTRODE ES AD CRDLSS PT RET REM POLYHESIVE

## (undated) DEVICE — FETAL PILLOW IS A SINGLE-USE,STERILE FETAL HEAD ELEVATOR CONSISTING OF A MECHANISM THAT ELEVATES THE FETAL HEAD TO FACILITATE DELIVERY DURING A CESAREAN SECTION.FETAL PILLOW IS INTENDED TO ELEVATE THE FETAL HEAD AND FACILITATE DELIVERY OF THE FETUS IN WOMEN REQUIRING A CESAREAN SECTION AT FULL DILATION OR THOSE REQUIRING A CESAREAN SECTION AFTER A FAILED INSTRUMENTAL VAGINAL DELIVERY.: Brand: SOS

## (undated) DEVICE — SHEET,DRAPE,53X77,STERILE: Brand: MEDLINE

## (undated) DEVICE — SKIN PREP TRAY 4 COMPARTM TRAY: Brand: MEDLINE INDUSTRIES, INC.

## (undated) DEVICE — MATTRESS MAXI AIR TRNSF SGL PT USE DCS 37 45 48 52 75

## (undated) DEVICE — APPLICATOR MEDICATED 26 CC SOLUTION HI LT ORNG CHLORAPREP

## (undated) DEVICE — SUTURE VICRYL 3-0 L36IN ABSRB VLT CT-1 L36MM 1/2 CIR J344H

## (undated) DEVICE — 1LYRTR 16FR10ML 100%SILI SNAP: Brand: MEDLINE INDUSTRIES, INC.

## (undated) DEVICE — 3M™ STERI-STRIP™ COMPOUND BENZOIN TINCTURE 40 BAGS/CARTON 4 CARTONS/CASE C1544: Brand: 3M™ STERI-STRIP™